# Patient Record
Sex: FEMALE | Race: WHITE | NOT HISPANIC OR LATINO | RURAL
[De-identification: names, ages, dates, MRNs, and addresses within clinical notes are randomized per-mention and may not be internally consistent; named-entity substitution may affect disease eponyms.]

---

## 2020-09-17 ENCOUNTER — HISTORICAL (OUTPATIENT)
Dept: ADMINISTRATIVE | Facility: HOSPITAL | Age: 40
End: 2020-09-17

## 2020-09-17 LAB — SARS-COV+SARS-COV-2 AG RESP QL IA.RAPID: NEGATIVE

## 2021-08-10 ENCOUNTER — OFFICE VISIT (OUTPATIENT)
Dept: FAMILY MEDICINE | Facility: CLINIC | Age: 41
End: 2021-08-10
Payer: MEDICAID

## 2021-08-10 DIAGNOSIS — Z11.52 ENCOUNTER FOR SCREENING FOR COVID-19: Primary | ICD-10-CM

## 2021-08-10 PROCEDURE — 87635 SARS-COV-2 COVID-19 AMP PRB: CPT | Mod: ,,, | Performed by: CLINICAL MEDICAL LABORATORY

## 2021-08-10 PROCEDURE — 87635 SARS-COV-2 (COVID-19) QUALITATIVE PCR: ICD-10-PCS | Mod: ,,, | Performed by: CLINICAL MEDICAL LABORATORY

## 2021-08-10 PROCEDURE — 99202 PR OFFICE/OUTPT VISIT, NEW, LEVL II, 15-29 MIN: ICD-10-PCS | Mod: GC,,, | Performed by: FAMILY MEDICINE

## 2021-08-10 PROCEDURE — 99202 OFFICE O/P NEW SF 15 MIN: CPT | Mod: GC,,, | Performed by: FAMILY MEDICINE

## 2021-08-11 LAB — SARS-COV-2 RNA RESP QL NAA+PROBE: NEGATIVE

## 2022-09-06 ENCOUNTER — OFFICE VISIT (OUTPATIENT)
Dept: FAMILY MEDICINE | Facility: CLINIC | Age: 42
End: 2022-09-06
Payer: MEDICAID

## 2022-09-06 VITALS
DIASTOLIC BLOOD PRESSURE: 93 MMHG | TEMPERATURE: 98 F | BODY MASS INDEX: 20.73 KG/M2 | WEIGHT: 129 LBS | RESPIRATION RATE: 18 BRPM | OXYGEN SATURATION: 99 % | HEART RATE: 96 BPM | SYSTOLIC BLOOD PRESSURE: 141 MMHG | HEIGHT: 66 IN

## 2022-09-06 DIAGNOSIS — Z11.52 ENCOUNTER FOR SCREENING LABORATORY TESTING FOR COVID-19 VIRUS: ICD-10-CM

## 2022-09-06 DIAGNOSIS — J32.9 SINUSITIS, UNSPECIFIED CHRONICITY, UNSPECIFIED LOCATION: Primary | ICD-10-CM

## 2022-09-06 PROBLEM — F19.10 POLYSUBSTANCE ABUSE: Status: ACTIVE | Noted: 2022-09-06

## 2022-09-06 PROBLEM — F41.9 ANXIETY: Status: ACTIVE | Noted: 2022-09-06

## 2022-09-06 PROBLEM — Z78.9 CURRENT DRINKER OF ALCOHOL: Status: ACTIVE | Noted: 2022-09-06

## 2022-09-06 PROBLEM — G40.909 SEIZURE DISORDER: Status: ACTIVE | Noted: 2022-09-06

## 2022-09-06 PROBLEM — F32.9 MAJOR DEPRESSION, SINGLE EPISODE: Status: ACTIVE | Noted: 2022-09-06

## 2022-09-06 PROBLEM — F41.0 PANIC ATTACK: Status: ACTIVE | Noted: 2022-09-06

## 2022-09-06 PROBLEM — I10 HYPERTENSION: Status: ACTIVE | Noted: 2022-09-06

## 2022-09-06 PROBLEM — Z87.898 H/O IDIOPATHIC SEIZURE: Status: ACTIVE | Noted: 2022-09-06

## 2022-09-06 LAB
CTP QC/QA: YES
SARS-COV-2 AG RESP QL IA.RAPID: NEGATIVE

## 2022-09-06 PROCEDURE — 99213 OFFICE O/P EST LOW 20 MIN: CPT | Mod: 25,,, | Performed by: NURSE PRACTITIONER

## 2022-09-06 PROCEDURE — 99213 PR OFFICE/OUTPT VISIT, EST, LEVL III, 20-29 MIN: ICD-10-PCS | Mod: 25,,, | Performed by: NURSE PRACTITIONER

## 2022-09-06 PROCEDURE — 1159F MED LIST DOCD IN RCRD: CPT | Mod: CPTII,,, | Performed by: NURSE PRACTITIONER

## 2022-09-06 PROCEDURE — 3008F BODY MASS INDEX DOCD: CPT | Mod: CPTII,,, | Performed by: NURSE PRACTITIONER

## 2022-09-06 PROCEDURE — 1160F RVW MEDS BY RX/DR IN RCRD: CPT | Mod: CPTII,,, | Performed by: NURSE PRACTITIONER

## 2022-09-06 PROCEDURE — 3077F SYST BP >= 140 MM HG: CPT | Mod: CPTII,,, | Performed by: NURSE PRACTITIONER

## 2022-09-06 PROCEDURE — 1159F PR MEDICATION LIST DOCUMENTED IN MEDICAL RECORD: ICD-10-PCS | Mod: CPTII,,, | Performed by: NURSE PRACTITIONER

## 2022-09-06 PROCEDURE — 3080F DIAST BP >= 90 MM HG: CPT | Mod: CPTII,,, | Performed by: NURSE PRACTITIONER

## 2022-09-06 PROCEDURE — 1160F PR REVIEW ALL MEDS BY PRESCRIBER/CLIN PHARMACIST DOCUMENTED: ICD-10-PCS | Mod: CPTII,,, | Performed by: NURSE PRACTITIONER

## 2022-09-06 PROCEDURE — 96372 PR INJECTION,THERAP/PROPH/DIAG2ST, IM OR SUBCUT: ICD-10-PCS | Mod: ,,, | Performed by: NURSE PRACTITIONER

## 2022-09-06 PROCEDURE — 3080F PR MOST RECENT DIASTOLIC BLOOD PRESSURE >= 90 MM HG: ICD-10-PCS | Mod: CPTII,,, | Performed by: NURSE PRACTITIONER

## 2022-09-06 PROCEDURE — 3008F PR BODY MASS INDEX (BMI) DOCUMENTED: ICD-10-PCS | Mod: CPTII,,, | Performed by: NURSE PRACTITIONER

## 2022-09-06 PROCEDURE — 3077F PR MOST RECENT SYSTOLIC BLOOD PRESSURE >= 140 MM HG: ICD-10-PCS | Mod: CPTII,,, | Performed by: NURSE PRACTITIONER

## 2022-09-06 PROCEDURE — 96372 THER/PROPH/DIAG INJ SC/IM: CPT | Mod: ,,, | Performed by: NURSE PRACTITIONER

## 2022-09-06 PROCEDURE — 87426 SARSCOV CORONAVIRUS AG IA: CPT | Mod: RHCUB | Performed by: NURSE PRACTITIONER

## 2022-09-06 RX ORDER — DEXAMETHASONE SODIUM PHOSPHATE 4 MG/ML
6 INJECTION, SOLUTION INTRA-ARTICULAR; INTRALESIONAL; INTRAMUSCULAR; INTRAVENOUS; SOFT TISSUE
Status: COMPLETED | OUTPATIENT
Start: 2022-09-06 | End: 2022-09-06

## 2022-09-06 RX ORDER — CEFDINIR 300 MG/1
300 CAPSULE ORAL 2 TIMES DAILY
Qty: 20 CAPSULE | Refills: 0 | Status: SHIPPED | OUTPATIENT
Start: 2022-09-06 | End: 2022-09-16

## 2022-09-06 RX ORDER — LURASIDONE HYDROCHLORIDE 80 MG/1
TABLET, FILM COATED ORAL
COMMUNITY
End: 2023-08-30

## 2022-09-06 RX ORDER — PREDNISONE 20 MG/1
40 TABLET ORAL DAILY
Qty: 10 TABLET | Refills: 0 | Status: SHIPPED | OUTPATIENT
Start: 2022-09-06 | End: 2022-09-11

## 2022-09-06 RX ORDER — TRAZODONE HYDROCHLORIDE 50 MG/1
TABLET ORAL
COMMUNITY
End: 2023-08-30

## 2022-09-06 RX ORDER — SUMATRIPTAN 50 MG/1
TABLET, FILM COATED ORAL
COMMUNITY
Start: 2022-04-27 | End: 2023-08-30

## 2022-09-06 RX ORDER — CEFTRIAXONE 1 G/1
1 INJECTION, POWDER, FOR SOLUTION INTRAMUSCULAR; INTRAVENOUS
Status: COMPLETED | OUTPATIENT
Start: 2022-09-06 | End: 2022-09-06

## 2022-09-06 RX ORDER — ZOLPIDEM TARTRATE 5 MG/1
TABLET ORAL
COMMUNITY
End: 2023-08-30

## 2022-09-06 RX ORDER — CLONAZEPAM 2 MG/1
1 TABLET ORAL
COMMUNITY
End: 2023-08-30

## 2022-09-06 RX ORDER — CITALOPRAM 20 MG/1
1 TABLET, FILM COATED ORAL
COMMUNITY
End: 2023-08-30

## 2022-09-06 RX ORDER — CHLORDIAZEPOXIDE HYDROCHLORIDE 25 MG/1
CAPSULE, GELATIN COATED ORAL
COMMUNITY
End: 2023-08-30

## 2022-09-06 RX ORDER — BUSPIRONE HYDROCHLORIDE 10 MG/1
TABLET ORAL
COMMUNITY
End: 2023-08-30

## 2022-09-06 RX ORDER — OXCARBAZEPINE 300 MG/1
1 TABLET, FILM COATED ORAL
COMMUNITY
End: 2023-08-30

## 2022-09-06 RX ADMIN — CEFTRIAXONE 1 G: 1 INJECTION, POWDER, FOR SOLUTION INTRAMUSCULAR; INTRAVENOUS at 10:09

## 2022-09-06 RX ADMIN — DEXAMETHASONE SODIUM PHOSPHATE 6 MG: 4 INJECTION, SOLUTION INTRA-ARTICULAR; INTRALESIONAL; INTRAMUSCULAR; INTRAVENOUS; SOFT TISSUE at 10:09

## 2022-09-06 NOTE — LETTER
September 6, 2022      [facility]  [address] [facility] [address]  Phone: [phone]  Fax: [phone]       Patient: Ericka Vo   YOB: 1980  Date of Visit: 09/06/2022    To Whom It May Concern:    Inocencia Vo  was at [facility] on 09/06/2022. The patient may return to work/school on 09/07/2022 with no restrictions. If you have any questions or concerns, or if I can be of further assistance, please do not hesitate to contact me.    Sincerely,    Delilah Mackenzie RN

## 2022-09-06 NOTE — PROGRESS NOTES
"Subjective:       Patient ID: Ericka Vo is a 42 y.o. female.    Chief Complaint: COVID-19 Concerns (Sinus pressure with congestion and drainage, sore throat)    Presents to clinic as above. No fever.S/s for 1 week    Review of Systems   Constitutional: Negative.    HENT:  Positive for congestion, sinus pain and sore throat. Negative for ear pain.    Respiratory:  Positive for cough. Negative for shortness of breath and wheezing.    Cardiovascular: Negative.    Musculoskeletal: Negative.    Neurological:  Positive for headaches.        Reviewed family, medical, surgical, and social history.    Objective:      BP (!) 141/93   Pulse 96   Temp 97.6 °F (36.4 °C)   Resp 18   Ht 5' 6" (1.676 m)   Wt 58.5 kg (129 lb)   LMP 08/25/2022 (Exact Date)   SpO2 99%   BMI 20.82 kg/m²   Physical Exam  Vitals and nursing note reviewed.   Constitutional:       General: She is not in acute distress.     Appearance: Normal appearance. She is normal weight. She is not ill-appearing, toxic-appearing or diaphoretic.   HENT:      Head: Normocephalic and atraumatic.      Right Ear: Hearing, tympanic membrane, ear canal and external ear normal.      Left Ear: Hearing, tympanic membrane, ear canal and external ear normal.      Nose: Nasal tenderness, mucosal edema, congestion and rhinorrhea present. Rhinorrhea is purulent.      Right Turbinates: Enlarged and swollen.      Left Turbinates: Enlarged and swollen.      Right Sinus: Maxillary sinus tenderness and frontal sinus tenderness present.      Left Sinus: Maxillary sinus tenderness and frontal sinus tenderness present.      Mouth/Throat:      Mouth: Mucous membranes are moist.      Pharynx: No oropharyngeal exudate or posterior oropharyngeal erythema.   Cardiovascular:      Rate and Rhythm: Normal rate and regular rhythm.      Heart sounds: Normal heart sounds.   Pulmonary:      Effort: Pulmonary effort is normal.      Breath sounds: Normal breath sounds.   Skin:     General: " Skin is warm and dry.   Neurological:      Mental Status: She is alert.   Psychiatric:         Mood and Affect: Mood normal.         Behavior: Behavior normal.         Thought Content: Thought content normal.         Judgment: Judgment normal.          Office Visit on 09/06/2022   Component Date Value Ref Range Status    SARS Coronavirus 2 Antigen 09/06/2022 Negative  Negative Final     Acceptable 09/06/2022 Yes   Final      Assessment:       1. Sinusitis, unspecified chronicity, unspecified location    2. Encounter for screening laboratory testing for COVID-19 virus          Plan:       Sinusitis, unspecified chronicity, unspecified location  -     cefdinir (OMNICEF) 300 MG capsule; Take 1 capsule (300 mg total) by mouth 2 (two) times daily. for 10 days  Dispense: 20 capsule; Refill: 0  -     dexamethasone injection 6 mg  -     cefTRIAXone injection 1 g  -     predniSONE (DELTASONE) 20 MG tablet; Take 2 tablets (40 mg total) by mouth once daily. for 5 days  Dispense: 10 tablet; Refill: 0    Encounter for screening laboratory testing for COVID-19 virus  -     SARS Coronavirus 2 Antigen, POCT  Copy of result given (neg rapid covid)  Retest with any new or persistent s/s  RTC PRN           Risks, benefits, and side effects were discussed with the patient. All questions were answered to the fullest satisfaction of the patient, and pt verbalized understanding and agreement to treatment plan. Pt was to call with any new or worsening symptoms, or present to the ER.

## 2022-11-02 ENCOUNTER — OFFICE VISIT (OUTPATIENT)
Dept: FAMILY MEDICINE | Facility: CLINIC | Age: 42
End: 2022-11-02
Payer: MEDICAID

## 2022-11-02 VITALS
OXYGEN SATURATION: 99 % | TEMPERATURE: 98 F | SYSTOLIC BLOOD PRESSURE: 144 MMHG | BODY MASS INDEX: 20.16 KG/M2 | WEIGHT: 121 LBS | HEART RATE: 87 BPM | DIASTOLIC BLOOD PRESSURE: 88 MMHG | HEIGHT: 65 IN

## 2022-11-02 DIAGNOSIS — J01.90 ACUTE NON-RECURRENT SINUSITIS, UNSPECIFIED LOCATION: Primary | ICD-10-CM

## 2022-11-02 DIAGNOSIS — S09.90XA TRAUMATIC INJURY OF HEAD, INITIAL ENCOUNTER: ICD-10-CM

## 2022-11-02 PROCEDURE — 99213 OFFICE O/P EST LOW 20 MIN: CPT | Mod: ,,, | Performed by: NURSE PRACTITIONER

## 2022-11-02 PROCEDURE — 3079F PR MOST RECENT DIASTOLIC BLOOD PRESSURE 80-89 MM HG: ICD-10-PCS | Mod: CPTII,,, | Performed by: NURSE PRACTITIONER

## 2022-11-02 PROCEDURE — 3008F BODY MASS INDEX DOCD: CPT | Mod: CPTII,,, | Performed by: NURSE PRACTITIONER

## 2022-11-02 PROCEDURE — 3008F PR BODY MASS INDEX (BMI) DOCUMENTED: ICD-10-PCS | Mod: CPTII,,, | Performed by: NURSE PRACTITIONER

## 2022-11-02 PROCEDURE — 1159F MED LIST DOCD IN RCRD: CPT | Mod: CPTII,,, | Performed by: NURSE PRACTITIONER

## 2022-11-02 PROCEDURE — 99213 PR OFFICE/OUTPT VISIT, EST, LEVL III, 20-29 MIN: ICD-10-PCS | Mod: ,,, | Performed by: NURSE PRACTITIONER

## 2022-11-02 PROCEDURE — 1159F PR MEDICATION LIST DOCUMENTED IN MEDICAL RECORD: ICD-10-PCS | Mod: CPTII,,, | Performed by: NURSE PRACTITIONER

## 2022-11-02 PROCEDURE — 3077F PR MOST RECENT SYSTOLIC BLOOD PRESSURE >= 140 MM HG: ICD-10-PCS | Mod: CPTII,,, | Performed by: NURSE PRACTITIONER

## 2022-11-02 PROCEDURE — 3077F SYST BP >= 140 MM HG: CPT | Mod: CPTII,,, | Performed by: NURSE PRACTITIONER

## 2022-11-02 PROCEDURE — 3079F DIAST BP 80-89 MM HG: CPT | Mod: CPTII,,, | Performed by: NURSE PRACTITIONER

## 2022-11-02 RX ORDER — GUAIFENESIN AND PHENYLEPHRINE HCL 385; 10 MG/1; MG/1
1 TABLET ORAL 4 TIMES DAILY PRN
Qty: 20 TABLET | Refills: 0 | Status: SHIPPED | OUTPATIENT
Start: 2022-11-02 | End: 2023-08-30

## 2022-11-02 RX ORDER — AMOXICILLIN AND CLAVULANATE POTASSIUM 875; 125 MG/1; MG/1
1 TABLET, FILM COATED ORAL 2 TIMES DAILY
Qty: 20 TABLET | Refills: 0 | Status: SHIPPED | OUTPATIENT
Start: 2022-11-02 | End: 2022-11-12

## 2022-11-02 NOTE — PROGRESS NOTES
Subjective:       Patient ID: Ericka Vo is a 42 y.o. female.    Chief Complaint: Cough (And drainage )    Cough, sinus  pressure and drainage with headache- pt states she hit her head a week ago and has been having headaches, nausea and some tingling in her hands (she refused to go to the Er despite alarming symptoms)  Review of Systems   Constitutional:  Negative for appetite change, fatigue and fever.   HENT:  Positive for nasal congestion, sinus pressure/congestion and sneezing. Negative for ear pain and sore throat.    Eyes:  Negative for pain, discharge and itching.   Respiratory:  Positive for cough. Negative for shortness of breath.    Cardiovascular:  Negative for chest pain and leg swelling.   Gastrointestinal:  Negative for abdominal pain, change in bowel habit, nausea, vomiting and change in bowel habit.   Musculoskeletal:  Negative for back pain, gait problem and neck pain.   Integumentary:  Negative for rash and wound.   Allergic/Immunologic: Negative for immunocompromised state.   Neurological:  Positive for headaches. Negative for dizziness, weakness and numbness.   All other systems reviewed and are negative.      Objective:      Physical Exam  Vitals and nursing note reviewed.   Constitutional:       General: She is not in acute distress.     Appearance: Normal appearance. She is not ill-appearing, toxic-appearing or diaphoretic.   HENT:      Head: Normocephalic.      Right Ear: Tympanic membrane, ear canal and external ear normal.      Left Ear: Tympanic membrane, ear canal and external ear normal.      Nose: Mucosal edema and congestion present. No rhinorrhea.      Right Turbinates: Swollen.      Left Turbinates: Swollen.      Right Sinus: Maxillary sinus tenderness present.      Left Sinus: Maxillary sinus tenderness present.      Mouth/Throat:      Mouth: Mucous membranes are moist.      Pharynx: Posterior oropharyngeal erythema present. No oropharyngeal exudate.   Eyes:      General: No  scleral icterus.        Right eye: No discharge.         Left eye: No discharge.      Extraocular Movements: Extraocular movements intact.      Conjunctiva/sclera: Conjunctivae normal.      Pupils: Pupils are equal, round, and reactive to light.   Cardiovascular:      Rate and Rhythm: Normal rate and regular rhythm.      Pulses: Normal pulses.      Heart sounds: Normal heart sounds. No murmur heard.  Pulmonary:      Effort: Pulmonary effort is normal. No respiratory distress.      Breath sounds: Normal breath sounds. No wheezing, rhonchi or rales.   Musculoskeletal:         General: Normal range of motion.      Cervical back: Neck supple. No tenderness.   Lymphadenopathy:      Cervical: No cervical adenopathy.   Skin:     General: Skin is warm and dry.      Capillary Refill: Capillary refill takes less than 2 seconds.      Findings: No rash.   Neurological:      Mental Status: She is alert and oriented to person, place, and time.      GCS: GCS eye subscore is 4. GCS verbal subscore is 5. GCS motor subscore is 6.      Cranial Nerves: Cranial nerves 2-12 are intact.      Sensory: Sensation is intact.      Motor: Motor function is intact.      Coordination: Coordination is intact.      Comments: A 1 cm raised contusion noted to the right lateral/posterior skull with mild ecchymosis noted   Psychiatric:         Mood and Affect: Mood normal.         Behavior: Behavior normal.         Thought Content: Thought content normal.         Judgment: Judgment normal.       Office Visit on 09/06/2022   Component Date Value Ref Range Status    SARS Coronavirus 2 Antigen 09/06/2022 Negative  Negative Final     Acceptable 09/06/2022 Yes   Final      Assessment:       1. Acute non-recurrent sinusitis, unspecified location        Plan:   Acute non-recurrent sinusitis, unspecified location  -     amoxicillin-clavulanate 875-125mg (AUGMENTIN) 875-125 mg per tablet; Take 1 tablet by mouth 2 (two) times daily. for 20 doses   Dispense: 20 tablet; Refill: 0  -     phenylephrine-guaiFENesin (DECONEX IR)  mg Tab; Take 1 tablet by mouth 4 (four) times daily as needed (congestion).  Dispense: 20 tablet; Refill: 0     Pt advised to go to the ER and discussed concerning symptoms to warrant a CT scan

## 2022-11-02 NOTE — LETTER
November 2, 2022      [facility]  [address] [facility] [address]  Phone: [phone]  Fax: [phone]       Patient: Ericka Vo   YOB: 1980  Date of Visit: 11/02/2022    To Whom It May Concern:    Inocencia Vo  was at [facility] on 11/02/2022. The patient may return to work/school on 11/03/2022 without restrictions. If you have any questions or concerns, or if I can be of further assistance, please do not hesitate to contact me.    Sincerely,    BENOIT Joseph

## 2022-11-23 ENCOUNTER — HOSPITAL ENCOUNTER (EMERGENCY)
Facility: HOSPITAL | Age: 42
Discharge: HOME | End: 2022-11-23
Attending: FAMILY MEDICINE
Payer: MEDICAID

## 2022-11-23 VITALS
WEIGHT: 116 LBS | TEMPERATURE: 98 F | RESPIRATION RATE: 19 BRPM | DIASTOLIC BLOOD PRESSURE: 81 MMHG | BODY MASS INDEX: 19.33 KG/M2 | HEART RATE: 110 BPM | SYSTOLIC BLOOD PRESSURE: 130 MMHG | OXYGEN SATURATION: 93 % | HEIGHT: 65 IN

## 2022-11-23 DIAGNOSIS — S20.211A RIB CONTUSION, RIGHT, INITIAL ENCOUNTER: ICD-10-CM

## 2022-11-23 DIAGNOSIS — B34.9 VIRAL SYNDROME: ICD-10-CM

## 2022-11-23 DIAGNOSIS — R04.2 HEMOPTYSIS: ICD-10-CM
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
## 2022-11-23 DIAGNOSIS — R74.01 ELEVATED AST (SGOT): ICD-10-CM

## 2022-11-23 DIAGNOSIS — D53.9 MACROCYTIC ANEMIA: Primary | ICD-10-CM

## 2022-11-23 DIAGNOSIS — Z78.9 CURRENT DRINKER OF ALCOHOL: ICD-10-CM

## 2022-11-23 LAB
ALBUMIN SERPL BCP-MCNC: 3.8 G/DL (ref 3.5–5)
ALBUMIN/GLOB SERPL: 0.9 {RATIO}
ALP SERPL-CCNC: 103 U/L (ref 37–98)
ALT SERPL W P-5'-P-CCNC: 41 U/L (ref 13–56)
ANION GAP SERPL CALCULATED.3IONS-SCNC: 13 MMOL/L (ref 7–16)
AST SERPL W P-5'-P-CCNC: 149 U/L (ref 15–37)
BASOPHILS # BLD AUTO: 0.05 K/UL (ref 0–0.2)
BASOPHILS NFR BLD AUTO: 1 % (ref 0–1)
BILIRUB SERPL-MCNC: 0.9 MG/DL (ref ?–1.2)
BUN SERPL-MCNC: 5 MG/DL (ref 7–18)
BUN/CREAT SERPL: 9 (ref 6–20)
CALCIUM SERPL-MCNC: 8.2 MG/DL (ref 8.5–10.1)
CHLORIDE SERPL-SCNC: 98 MMOL/L (ref 98–107)
CO2 SERPL-SCNC: 30 MMOL/L (ref 21–32)
CREAT SERPL-MCNC: 0.56 MG/DL (ref 0.55–1.02)
DIFFERENTIAL METHOD BLD: ABNORMAL
EGFR (NO RACE VARIABLE) (RUSH/TITUS): 116 ML/MIN/1.73M²
EOSINOPHIL # BLD AUTO: 0.02 K/UL (ref 0–0.5)
EOSINOPHIL NFR BLD AUTO: 0.4 % (ref 1–4)
ERYTHROCYTE [DISTWIDTH] IN BLOOD BY AUTOMATED COUNT: 13.4 % (ref 11.5–14.5)
FLUAV AG UPPER RESP QL IA.RAPID: NEGATIVE
FLUBV AG UPPER RESP QL IA.RAPID: NEGATIVE
GLOBULIN SER-MCNC: 4.1 G/DL (ref 2–4)
GLUCOSE SERPL-MCNC: 117 MG/DL (ref 74–106)
HCT VFR BLD AUTO: 38.5 % (ref 38–47)
HGB BLD-MCNC: 13.5 G/DL (ref 12–16)
IMM GRANULOCYTES # BLD AUTO: 0.01 K/UL (ref 0–0.04)
IMM GRANULOCYTES NFR BLD: 0.2 % (ref 0–0.4)
LYMPHOCYTES # BLD AUTO: 0.67 K/UL (ref 1–4.8)
LYMPHOCYTES NFR BLD AUTO: 13.2 % (ref 27–41)
LYMPHOCYTES NFR BLD MANUAL: 13 % (ref 27–41)
MACROCYTES BLD QL SMEAR: ABNORMAL
MCH RBC QN AUTO: 37.4 PG (ref 27–31)
MCHC RBC AUTO-ENTMCNC: 35.1 G/DL (ref 32–36)
MCV RBC AUTO: 106.6 FL (ref 80–96)
MONOCYTES # BLD AUTO: 0.31 K/UL (ref 0–0.8)
MONOCYTES NFR BLD AUTO: 6.1 % (ref 2–6)
MONOCYTES NFR BLD MANUAL: 6 % (ref 2–6)
MPC BLD CALC-MCNC: 9.1 FL (ref 9.4–12.4)
NEUTROPHILS # BLD AUTO: 4.01 K/UL (ref 1.8–7.7)
NEUTROPHILS NFR BLD AUTO: 79.1 % (ref 53–65)
NEUTS BAND NFR BLD MANUAL: 2 % (ref 1–5)
NEUTS SEG NFR BLD MANUAL: 79 % (ref 50–62)
NRBC # BLD AUTO: 0 X10E3/UL
NRBC, AUTO (.00): 0 %
PLATELET # BLD AUTO: 129 K/UL (ref 150–400)
PLATELET MORPHOLOGY: ABNORMAL
POLYCHROMASIA BLD QL SMEAR: ABNORMAL
POTASSIUM SERPL-SCNC: 3 MMOL/L (ref 3.5–5.1)
PROT SERPL-MCNC: 7.9 G/DL (ref 6.4–8.2)
RBC # BLD AUTO: 3.61 M/UL (ref 4.2–5.4)
SARS-COV+SARS-COV-2 AG RESP QL IA.RAPID: NEGATIVE
SODIUM SERPL-SCNC: 138 MMOL/L (ref 136–145)
STOMATOCYTES BLD QL SMEAR: ABNORMAL
WBC # BLD AUTO: 5.07 K/UL (ref 4.5–11)

## 2022-11-23 PROCEDURE — 99284 EMERGENCY DEPT VISIT MOD MDM: CPT | Mod: CS,,, | Performed by: NURSE PRACTITIONER

## 2022-11-23 PROCEDURE — 99284 PR EMERGENCY DEPT VISIT,LEVEL IV: ICD-10-PCS | Mod: CS,,, | Performed by: NURSE PRACTITIONER

## 2022-11-23 PROCEDURE — 25000003 PHARM REV CODE 250: Performed by: NURSE PRACTITIONER

## 2022-11-23 PROCEDURE — 99285 EMERGENCY DEPT VISIT HI MDM: CPT | Mod: 25

## 2022-11-23 PROCEDURE — 85025 COMPLETE CBC W/AUTO DIFF WBC: CPT | Performed by: NURSE PRACTITIONER

## 2022-11-23 PROCEDURE — 87428 SARSCOV & INF VIR A&B AG IA: CPT | Performed by: NURSE PRACTITIONER

## 2022-11-23 PROCEDURE — 80053 COMPREHEN METABOLIC PANEL: CPT | Performed by: NURSE PRACTITIONER

## 2022-11-23 RX ORDER — ONDANSETRON 4 MG/1
4 TABLET, FILM COATED ORAL EVERY 6 HOURS
Qty: 12 TABLET | Refills: 0 | Status: SHIPPED | OUTPATIENT
Start: 2022-11-23 | End: 2023-08-30

## 2022-11-23 RX ORDER — PANTOPRAZOLE SODIUM 40 MG/1
40 TABLET, DELAYED RELEASE ORAL 2 TIMES DAILY
Qty: 60 TABLET | Refills: 11 | Status: SHIPPED | OUTPATIENT
Start: 2022-11-23 | End: 2023-08-30

## 2022-11-23 RX ORDER — POTASSIUM CHLORIDE 750 MG/1
10 TABLET, EXTENDED RELEASE ORAL DAILY
Qty: 5 TABLET | Refills: 0 | Status: SHIPPED | OUTPATIENT
Start: 2022-11-23 | End: 2022-11-28

## 2022-11-23 RX ORDER — POTASSIUM CHLORIDE 20 MEQ/1
40 TABLET, EXTENDED RELEASE ORAL ONCE
Status: COMPLETED | OUTPATIENT
Start: 2022-11-23 | End: 2022-11-23

## 2022-11-23 RX ADMIN — POTASSIUM CHLORIDE 40 MEQ: 20 TABLET, EXTENDED RELEASE ORAL at 05:11

## 2022-11-23 NOTE — ED PROVIDER NOTES
Encounter Date: 11/23/2022       History     Chief Complaint   Patient presents with    Hemoptysis     42 year old female presents to ED with complaint of right side pain and coughing up blood. She states for the last 2 days she has had a cough with blood noted in cough on today. She states she was also having vomiting and diarrhea for last 2 days as well with chills. Denies fever, shortness of breath. States she went to urgent care with recent flu/covid testing. Unable to locate testing at this time.       Review of patient's allergies indicates:  No Known Allergies  No past medical history on file.  No past surgical history on file.  No family history on file.     Review of Systems   Constitutional:  Positive for chills. Negative for fever.   HENT:  Negative for sinus pressure and sinus pain.    Eyes:  Negative for photophobia and visual disturbance.   Respiratory:  Negative for cough and shortness of breath.    Cardiovascular:  Negative for chest pain and palpitations.   Gastrointestinal:  Positive for diarrhea and vomiting. Negative for abdominal distention and constipation.   Endocrine: Negative for cold intolerance and heat intolerance.   Genitourinary:  Negative for difficulty urinating and dysuria.   Musculoskeletal:  Positive for myalgias. Negative for arthralgias.   Skin:  Negative for color change and wound.   Allergic/Immunologic: Negative for environmental allergies and food allergies.   Neurological:  Negative for dizziness and weakness.   Hematological:  Negative for adenopathy. Does not bruise/bleed easily.   Psychiatric/Behavioral:  Negative for agitation and confusion.    All other systems reviewed and are negative.    Physical Exam     Initial Vitals [11/23/22 1606]   BP Pulse Resp Temp SpO2   130/81 110 19 98.1 °F (36.7 °C) (!) 93 %      MAP       --         Physical Exam    Nursing note and vitals reviewed.  Constitutional: She appears well-developed and well-nourished.   HENT:   Head:  Normocephalic and atraumatic.   Eyes: Pupils are equal, round, and reactive to light.   Neck: Neck supple.   Normal range of motion.  Cardiovascular:  Normal rate.           No murmur heard.  Pulmonary/Chest: She has no wheezes. She has no rhonchi. She exhibits tenderness.     Abdominal: Abdomen is soft. She exhibits no distension. There is no abdominal tenderness.   Musculoskeletal:         General: No tenderness or edema.      Cervical back: Normal range of motion and neck supple.     Neurological: She is alert and oriented to person, place, and time.   Skin: Skin is warm and dry. Capillary refill takes less than 2 seconds.   Psychiatric: Thought content normal.       Medical Screening Exam   See Full Note    ED Course   Procedures  Labs Reviewed   COMPREHENSIVE METABOLIC PANEL - Abnormal; Notable for the following components:       Result Value    Potassium 3.0 (*)     Glucose 117 (*)     BUN 5 (*)     Calcium 8.2 (*)     Globulin 4.1 (*)     Alk Phos 103 (*)      (*)     All other components within normal limits   CBC WITH DIFFERENTIAL - Abnormal; Notable for the following components:    RBC 3.61 (*)     .6 (*)     MCH 37.4 (*)     Platelet Count 129 (*)     MPV 9.1 (*)     Neutrophils % 79.1 (*)     Lymphocytes % 13.2 (*)     Monocytes % 6.1 (*)     Eosinophils % 0.4 (*)     Lymphocytes, Absolute 0.67 (*)     All other components within normal limits   MANUAL DIFFERENTIAL - Abnormal; Notable for the following components:    Segmented Neutrophils, Man % 79 (*)     Lymphocytes, Man % 13 (*)     Platelet Morphology Decreased (*)     All other components within normal limits   SARS-COV2 (COVID) W/ FLU ANTIGEN - Normal    Narrative:     Negative SARS-CoV results should not be used as the sole basis for treatment or patient management decisions; negative results should be considered in the context of a patient's recent exposures, history and the presene of clinical signs and symptoms consistent with  COVID-19.  Negative results should be treated as presumptive and confirmed by molecular assay, if necessary for patient management.   CBC W/ AUTO DIFFERENTIAL    Narrative:     The following orders were created for panel order CBC auto differential.  Procedure                               Abnormality         Status                     ---------                               -----------         ------                     CBC with Differential[[id number]]        Abnormal            Final result               Manual Differential[[id number]]          Abnormal            Final result                 Please view results for these tests on the individual orders.          Imaging Results              X-Ray Ribs 2 View Right (Final result)  Result time 11/23/22 16:54:07      Final result by Fortino Juarez DO (11/23/22 16:54:07)                   Impression:      No acutely displaced right-sided rib fractures.      Electronically signed by: Fortino Juarez  Date:    11/23/2022  Time:    16:54               Narrative:    EXAMINATION:  XR RIBS 2 VIEW RIGHT    CLINICAL HISTORY:  Unspecified fall, initial encounter    TECHNIQUE:  XR RIBS 2 VIEW RIGHT    COMPARISON:  1/17/2020    FINDINGS:  Lungs are clear.  No pleural abnormality.  No acutely displaced right-sided rib fractures.                                       X-Ray Chest AP Portable (Final result)  Result time 11/23/22 16:52:49      Final result by Fortino Juarez DO (11/23/22 16:52:49)                   Impression:      No acute pulmonary disease      Electronically signed by: Fortino Juarez  Date:    11/23/2022  Time:    16:52               Narrative:    EXAMINATION:  XR CHEST AP PORTABLE    CLINICAL HISTORY:  Hemoptysis    TECHNIQUE:  XR CHEST AP PORTABLE    COMPARISON:  2020    FINDINGS:  No lines or tubes.    Lungs are clear.    Normal pleura.    Cardiac silhouette is similar to comparison exam.    No obvious acute bone findings.                                        Medications   potassium chloride SA CR tablet 40 mEq (40 mEq Oral Given 11/23/22 1743)           APC / Resident Notes:   Review of labs notable for elevated liver enzymes and MCV. Endorses intake of 6 pack of beer daily. Instructed on alcohol avoidance.            Clinical Impression:   Final diagnoses:  [W19.XXXA] Fall  [R04.2] Hemoptysis  [D53.9] Macrocytic anemia (Primary)  [B34.9] Viral syndrome  [R74.01] Elevated AST (SGOT)  [Z78.9] Current drinker of alcohol  [S20.211A] Rib contusion, right, initial encounter        ED Disposition Condition    Discharge Stable          ED Prescriptions       Medication Sig Dispense Start Date End Date Auth. Provider    ondansetron (ZOFRAN) 4 MG tablet Take 1 tablet (4 mg total) by mouth every 6 (six) hours. 12 tablet 11/23/2022 -- BENOIT Gonzalez    pantoprazole (PROTONIX) 40 MG tablet Take 1 tablet (40 mg total) by mouth 2 (two) times daily. 60 tablet 11/23/2022 11/23/2023 BENOTI Gonzalez    potassium chloride (KLOR-CON) 10 MEQ TbSR Take 1 tablet (10 mEq total) by mouth once daily. for 5 days 5 tablet 11/23/2022 11/28/2022 BENOIT Gonzalez          Follow-up Information    None          BENOIT Gonzalez  11/23/22 7090       BENOIT Gonzalez  11/23/22 6100

## 2022-11-23 NOTE — ED TRIAGE NOTES
PATIENT PRESENTS TO ER WITH COMPLAINT FALL WHICH INJURIED HER RIGHT SIDE RIB 2 DAYS AGO. NOW HAS A COMPLAINT OF VOMITING BLOOD

## 2023-02-06 PROBLEM — J01.90 ACUTE NON-RECURRENT SINUSITIS: Status: RESOLVED | Noted: 2022-11-02 | Resolved: 2023-02-06

## 2023-07-07 ENCOUNTER — HOSPITAL ENCOUNTER (OUTPATIENT)
Dept: RADIOLOGY | Facility: HOSPITAL | Age: 43
Discharge: HOME | End: 2023-07-07
Attending: SPECIALIST
Payer: MEDICAID

## 2023-07-07 VITALS — HEIGHT: 64 IN | BODY MASS INDEX: 21.34 KG/M2 | WEIGHT: 125 LBS

## 2023-07-07 DIAGNOSIS — Z12.31 OTHER SCREENING MAMMOGRAM: ICD-10-CM

## 2023-07-07 PROCEDURE — 77067 SCR MAMMO BI INCL CAD: CPT | Mod: TC

## 2023-08-30 ENCOUNTER — OFFICE VISIT (OUTPATIENT)
Dept: FAMILY MEDICINE | Facility: CLINIC | Age: 43
End: 2023-08-30
Payer: MEDICAID

## 2023-08-30 VITALS
DIASTOLIC BLOOD PRESSURE: 84 MMHG | WEIGHT: 122 LBS | HEIGHT: 64 IN | RESPIRATION RATE: 17 BRPM | HEART RATE: 91 BPM | SYSTOLIC BLOOD PRESSURE: 116 MMHG | TEMPERATURE: 99 F | OXYGEN SATURATION: 96 % | BODY MASS INDEX: 20.83 KG/M2

## 2023-08-30 DIAGNOSIS — R11.2 NAUSEA AND VOMITING, UNSPECIFIED VOMITING TYPE: ICD-10-CM

## 2023-08-30 DIAGNOSIS — N91.2 AMENORRHEA: ICD-10-CM

## 2023-08-30 DIAGNOSIS — R30.0 DYSURIA: ICD-10-CM

## 2023-08-30 DIAGNOSIS — R10.9 ABDOMINAL PAIN, UNSPECIFIED ABDOMINAL LOCATION: Primary | ICD-10-CM

## 2023-08-30 DIAGNOSIS — Z20.828 CONTACT WITH AND (SUSPECTED) EXPOSURE TO OTHER VIRAL COMMUNICABLE DISEASES: ICD-10-CM

## 2023-08-30 PROBLEM — F32.A DEPRESSION: Status: ACTIVE | Noted: 2023-08-30

## 2023-08-30 LAB
B-HCG UR QL: NEGATIVE
BILIRUB SERPL-MCNC: NEGATIVE MG/DL
BLOOD URINE, POC: NEGATIVE
COLOR, POC UA: YELLOW
CTP QC/QA: YES
FLUAV AG NPH QL: NEGATIVE
FLUBV AG NPH QL: NEGATIVE
GLUCOSE UR QL STRIP: NEGATIVE
KETONES UR QL STRIP: NEGATIVE
LEUKOCYTE ESTERASE URINE, POC: ABNORMAL
NITRITE, POC UA: NEGATIVE
PH, POC UA: 7
PROTEIN, POC: NEGATIVE
SARS-COV-2 AG RESP QL IA.RAPID: NEGATIVE
SPECIFIC GRAVITY, POC UA: 1.01
UROBILINOGEN, POC UA: 1

## 2023-08-30 PROCEDURE — 87426 SARSCOV CORONAVIRUS AG IA: CPT | Mod: RHCUB | Performed by: NURSE PRACTITIONER

## 2023-08-30 PROCEDURE — 3008F PR BODY MASS INDEX (BMI) DOCUMENTED: ICD-10-PCS | Mod: CPTII,,, | Performed by: NURSE PRACTITIONER

## 2023-08-30 PROCEDURE — 1159F PR MEDICATION LIST DOCUMENTED IN MEDICAL RECORD: ICD-10-PCS | Mod: CPTII,,, | Performed by: NURSE PRACTITIONER

## 2023-08-30 PROCEDURE — 1160F PR REVIEW ALL MEDS BY PRESCRIBER/CLIN PHARMACIST DOCUMENTED: ICD-10-PCS | Mod: CPTII,,, | Performed by: NURSE PRACTITIONER

## 2023-08-30 PROCEDURE — 1160F RVW MEDS BY RX/DR IN RCRD: CPT | Mod: CPTII,,, | Performed by: NURSE PRACTITIONER

## 2023-08-30 PROCEDURE — 1159F MED LIST DOCD IN RCRD: CPT | Mod: CPTII,,, | Performed by: NURSE PRACTITIONER

## 2023-08-30 PROCEDURE — 3074F SYST BP LT 130 MM HG: CPT | Mod: CPTII,,, | Performed by: NURSE PRACTITIONER

## 2023-08-30 PROCEDURE — 81025 URINE PREGNANCY TEST: CPT | Mod: RHCUB | Performed by: NURSE PRACTITIONER

## 2023-08-30 PROCEDURE — 87804 INFLUENZA ASSAY W/OPTIC: CPT | Mod: RHCUB | Performed by: NURSE PRACTITIONER

## 2023-08-30 PROCEDURE — 3008F BODY MASS INDEX DOCD: CPT | Mod: CPTII,,, | Performed by: NURSE PRACTITIONER

## 2023-08-30 PROCEDURE — 3079F PR MOST RECENT DIASTOLIC BLOOD PRESSURE 80-89 MM HG: ICD-10-PCS | Mod: CPTII,,, | Performed by: NURSE PRACTITIONER

## 2023-08-30 PROCEDURE — 87186 CULTURE, URINE: ICD-10-PCS | Mod: ,,, | Performed by: CLINICAL MEDICAL LABORATORY

## 2023-08-30 PROCEDURE — 87086 URINE CULTURE/COLONY COUNT: CPT | Mod: ,,, | Performed by: CLINICAL MEDICAL LABORATORY

## 2023-08-30 PROCEDURE — 99214 OFFICE O/P EST MOD 30 MIN: CPT | Mod: ,,, | Performed by: NURSE PRACTITIONER

## 2023-08-30 PROCEDURE — 87186 SC STD MICRODIL/AGAR DIL: CPT | Mod: ,,, | Performed by: CLINICAL MEDICAL LABORATORY

## 2023-08-30 PROCEDURE — 87077 CULTURE, URINE: ICD-10-PCS | Mod: ,,, | Performed by: CLINICAL MEDICAL LABORATORY

## 2023-08-30 PROCEDURE — 81003 URINALYSIS AUTO W/O SCOPE: CPT | Mod: RHCUB | Performed by: NURSE PRACTITIONER

## 2023-08-30 PROCEDURE — 3079F DIAST BP 80-89 MM HG: CPT | Mod: CPTII,,, | Performed by: NURSE PRACTITIONER

## 2023-08-30 PROCEDURE — 87086 CULTURE, URINE: ICD-10-PCS | Mod: ,,, | Performed by: CLINICAL MEDICAL LABORATORY

## 2023-08-30 PROCEDURE — 3074F PR MOST RECENT SYSTOLIC BLOOD PRESSURE < 130 MM HG: ICD-10-PCS | Mod: CPTII,,, | Performed by: NURSE PRACTITIONER

## 2023-08-30 PROCEDURE — 99214 PR OFFICE/OUTPT VISIT, EST, LEVL IV, 30-39 MIN: ICD-10-PCS | Mod: ,,, | Performed by: NURSE PRACTITIONER

## 2023-08-30 PROCEDURE — 87077 CULTURE AEROBIC IDENTIFY: CPT | Mod: ,,, | Performed by: CLINICAL MEDICAL LABORATORY

## 2023-08-30 RX ORDER — BUSPIRONE HYDROCHLORIDE 10 MG/1
TABLET ORAL
COMMUNITY
End: 2023-12-06

## 2023-08-30 RX ORDER — LURASIDONE HYDROCHLORIDE 80 MG/1
TABLET, FILM COATED ORAL
COMMUNITY
End: 2023-12-06

## 2023-08-30 RX ORDER — ONDANSETRON 4 MG/1
4 TABLET, ORALLY DISINTEGRATING ORAL EVERY 8 HOURS PRN
Qty: 15 TABLET | Refills: 0 | Status: SHIPPED | OUTPATIENT
Start: 2023-08-30 | End: 2023-12-06

## 2023-08-30 RX ORDER — CHLORDIAZEPOXIDE HYDROCHLORIDE 25 MG/1
1 CAPSULE, GELATIN COATED ORAL
COMMUNITY
End: 2023-12-06

## 2023-08-30 RX ORDER — NITROFURANTOIN 25; 75 MG/1; MG/1
100 CAPSULE ORAL 2 TIMES DAILY
Qty: 14 CAPSULE | Refills: 0 | Status: SHIPPED | OUTPATIENT
Start: 2023-08-30 | End: 2023-09-06

## 2023-08-30 NOTE — PROGRESS NOTES
"Subjective:       Patient ID: Ericka Vo is a 43 y.o. female.    Chief Complaint: Nausea (For days. ), Vomiting (Several times in 4 days. ), and Diarrhea (Several times in 4 days. )    Presents to clinic as above. Son is here with her with same s/s. Nausea for 4 days. Vomiting started yesterday. Vomited several times yesterday and once today. Menses has been irregular this year. She thinks she is perimenopausal. Has had diarrhea a couple of times today. Urine is malodorous. No fever. No URI s/s.    Review of Systems   Constitutional: Negative.    HENT: Negative.     Respiratory: Negative.     Cardiovascular: Negative.    Gastrointestinal:  Positive for abdominal pain, diarrhea, nausea and vomiting. Negative for blood in stool, constipation and melena.   Genitourinary:  Positive for dysuria. Negative for flank pain, frequency, hematuria and urgency.          Reviewed family, medical, surgical, and social history.    Objective:      /84 (BP Location: Left arm, Patient Position: Sitting, BP Method: Medium (Manual))   Pulse 91   Temp 99 °F (37.2 °C) (Oral)   Resp 17   Ht 5' 4" (1.626 m)   Wt 55.3 kg (122 lb)   SpO2 96%   BMI 20.94 kg/m²   Physical Exam  Vitals and nursing note reviewed.   Constitutional:       General: She is not in acute distress.     Appearance: Normal appearance. She is normal weight. She is not ill-appearing, toxic-appearing or diaphoretic.   HENT:      Head: Normocephalic.      Right Ear: Tympanic membrane, ear canal and external ear normal.      Left Ear: Tympanic membrane, ear canal and external ear normal.      Nose: Nose normal.      Mouth/Throat:      Mouth: Mucous membranes are moist.      Pharynx: Oropharynx is clear. No oropharyngeal exudate or posterior oropharyngeal erythema.   Cardiovascular:      Rate and Rhythm: Normal rate and regular rhythm.      Heart sounds: Normal heart sounds.   Pulmonary:      Effort: Pulmonary effort is normal.      Breath sounds: Normal " breath sounds.   Abdominal:      General: Abdomen is flat. Bowel sounds are normal. There is no distension.      Palpations: Abdomen is soft. There is no mass.      Tenderness: There is no abdominal tenderness. There is no right CVA tenderness, left CVA tenderness, guarding or rebound.      Hernia: No hernia is present.   Musculoskeletal:      Cervical back: Normal range of motion and neck supple. No rigidity or tenderness.   Lymphadenopathy:      Cervical: No cervical adenopathy.   Skin:     General: Skin is warm and dry.      Capillary Refill: Capillary refill takes less than 2 seconds.   Neurological:      Mental Status: She is alert and oriented to person, place, and time.   Psychiatric:         Mood and Affect: Mood normal.         Behavior: Behavior normal.         Thought Content: Thought content normal.         Judgment: Judgment normal.            Office Visit on 08/30/2023   Component Date Value Ref Range Status    SARS Coronavirus 2 Antigen 08/30/2023 Negative  Negative Final     Acceptable 08/30/2023 Yes   Final    Color, UA 08/30/2023 Yellow   Final    Spec Grav UA 08/30/2023 1.010   Final    pH, UA 08/30/2023 7.0   Final    WBC, UA 08/30/2023 Small   Final    Nitrite, UA 08/30/2023 Negative   Final    Protein, POC 08/30/2023 Negative   Final    Glucose, UA 08/30/2023 Negative   Final    Ketones, UA 08/30/2023 Negative   Final    Bilirubin, POC 08/30/2023 Negative   Final    Urobilinogen, UA 08/30/2023 1.0   Final    Blood, UA 08/30/2023 Negative   Final    POC Preg Test, Ur 08/30/2023 Negative  Negative Final     Acceptable 08/30/2023 Yes   Final    Rapid Influenza A Ag 08/30/2023 Negative  Negative Final    Rapid Influenza B Ag 08/30/2023 Negative  Negative Final     Acceptable 08/30/2023 Yes   Final      Assessment:       1. Abdominal pain, unspecified abdominal location    2. Contact with and (suspected) exposure to other viral communicable diseases    3.  Dysuria    4. Amenorrhea    5. Nausea and vomiting, unspecified vomiting type        Plan:       Abdominal pain, unspecified abdominal location  -     Cancel: X-Ray Abdomen AP 1 View; Future; Expected date: 08/30/2023    Contact with and (suspected) exposure to other viral communicable diseases  -     SARS Coronavirus 2 Antigen, POCT  -     POCT Influenza A/B Rapid Antigen    Dysuria  -     POCT URINALYSIS W/O SCOPE  -     Urine culture; Future; Expected date: 08/30/2023  -     nitrofurantoin, macrocrystal-monohydrate, (MACROBID) 100 MG capsule; Take 1 capsule (100 mg total) by mouth 2 (two) times daily. for 7 days  Dispense: 14 capsule; Refill: 0    Amenorrhea  -     POCT urine pregnancy    Nausea and vomiting, unspecified vomiting type  -     ondansetron (ZOFRAN-ODT) 4 MG TbDL; Take 1 tablet (4 mg total) by mouth every 8 (eight) hours as needed (N/V).  Dispense: 15 tablet; Refill: 0    Declined KUB.   Discussed likely viral gastroenteritis   F/U with persistent or new s/s and prn          Risks, benefits, and side effects were discussed with the patient. All questions were answered to the fullest satisfaction of the patient, and pt verbalized understanding and agreement to treatment plan. Pt was to call with any new or worsening symptoms, or present to the ER.

## 2023-09-01 LAB — UA COMPLETE W REFLEX CULTURE PNL UR: ABNORMAL

## 2023-09-13 ENCOUNTER — TELEPHONE (OUTPATIENT)
Dept: FAMILY MEDICINE | Facility: CLINIC | Age: 43
End: 2023-09-13
Payer: MEDICAID

## 2023-09-13 NOTE — TELEPHONE ENCOUNTER
Pt notified. Voiced understanding. ----- Message from BENOIT Olivier sent at 9/4/2023  8:31 AM CDT -----  On macrobid

## 2023-10-11 ENCOUNTER — OFFICE VISIT (OUTPATIENT)
Dept: FAMILY MEDICINE | Facility: CLINIC | Age: 43
End: 2023-10-11
Payer: MEDICAID

## 2023-10-11 ENCOUNTER — APPOINTMENT (OUTPATIENT)
Dept: RADIOLOGY | Facility: CLINIC | Age: 43
End: 2023-10-11
Attending: FAMILY MEDICINE
Payer: MEDICAID

## 2023-10-11 VITALS
BODY MASS INDEX: 21.2 KG/M2 | HEART RATE: 100 BPM | HEIGHT: 64 IN | RESPIRATION RATE: 18 BRPM | DIASTOLIC BLOOD PRESSURE: 80 MMHG | WEIGHT: 124.19 LBS | TEMPERATURE: 99 F | OXYGEN SATURATION: 100 % | SYSTOLIC BLOOD PRESSURE: 122 MMHG

## 2023-10-11 DIAGNOSIS — Z72.51 HIGH RISK HETEROSEXUAL BEHAVIOR: ICD-10-CM

## 2023-10-11 DIAGNOSIS — R10.84 GENERALIZED ABDOMINAL PAIN: ICD-10-CM

## 2023-10-11 DIAGNOSIS — R10.84 GENERALIZED ABDOMINAL PAIN: Primary | ICD-10-CM

## 2023-10-11 PROCEDURE — 74018 RADEX ABDOMEN 1 VIEW: CPT | Mod: 26,,, | Performed by: RADIOLOGY

## 2023-10-11 PROCEDURE — 87591 CHLAMYDIA/GONORRHOEAE(GC), PCR: ICD-10-PCS | Mod: ,,, | Performed by: CLINICAL MEDICAL LABORATORY

## 2023-10-11 PROCEDURE — 87661 TRICHOMONAS VAGINALIS BY PCR: ICD-10-PCS | Mod: ,,, | Performed by: CLINICAL MEDICAL LABORATORY

## 2023-10-11 PROCEDURE — 3074F SYST BP LT 130 MM HG: CPT | Mod: CPTII,,, | Performed by: FAMILY MEDICINE

## 2023-10-11 PROCEDURE — 87491 CHLMYD TRACH DNA AMP PROBE: CPT | Mod: ,,, | Performed by: CLINICAL MEDICAL LABORATORY

## 2023-10-11 PROCEDURE — 74018 RADEX ABDOMEN 1 VIEW: CPT | Mod: TC,RHCUB | Performed by: FAMILY MEDICINE

## 2023-10-11 PROCEDURE — 1160F RVW MEDS BY RX/DR IN RCRD: CPT | Mod: CPTII,,, | Performed by: FAMILY MEDICINE

## 2023-10-11 PROCEDURE — 1159F PR MEDICATION LIST DOCUMENTED IN MEDICAL RECORD: ICD-10-PCS | Mod: CPTII,,, | Performed by: FAMILY MEDICINE

## 2023-10-11 PROCEDURE — 87389 HIV 1 / 2 ANTIBODY: ICD-10-PCS | Mod: ,,, | Performed by: CLINICAL MEDICAL LABORATORY

## 2023-10-11 PROCEDURE — 80053 COMPREHEN METABOLIC PANEL: CPT | Mod: ,,, | Performed by: CLINICAL MEDICAL LABORATORY

## 2023-10-11 PROCEDURE — 3079F PR MOST RECENT DIASTOLIC BLOOD PRESSURE 80-89 MM HG: ICD-10-PCS | Mod: CPTII,,, | Performed by: FAMILY MEDICINE

## 2023-10-11 PROCEDURE — 3008F PR BODY MASS INDEX (BMI) DOCUMENTED: ICD-10-PCS | Mod: CPTII,,, | Performed by: FAMILY MEDICINE

## 2023-10-11 PROCEDURE — 74018 XR KUB: ICD-10-PCS | Mod: 26,,, | Performed by: RADIOLOGY

## 2023-10-11 PROCEDURE — 3079F DIAST BP 80-89 MM HG: CPT | Mod: CPTII,,, | Performed by: FAMILY MEDICINE

## 2023-10-11 PROCEDURE — 85025 COMPLETE CBC W/AUTO DIFF WBC: CPT | Mod: ,,, | Performed by: CLINICAL MEDICAL LABORATORY

## 2023-10-11 PROCEDURE — 99214 PR OFFICE/OUTPT VISIT, EST, LEVL IV, 30-39 MIN: ICD-10-PCS | Mod: ,,, | Performed by: FAMILY MEDICINE

## 2023-10-11 PROCEDURE — 85025 CBC WITH DIFFERENTIAL: ICD-10-PCS | Mod: ,,, | Performed by: CLINICAL MEDICAL LABORATORY

## 2023-10-11 PROCEDURE — 87661 TRICHOMONAS VAGINALIS AMPLIF: CPT | Mod: ,,, | Performed by: CLINICAL MEDICAL LABORATORY

## 2023-10-11 PROCEDURE — 83690 ASSAY OF LIPASE: CPT | Mod: ,,, | Performed by: CLINICAL MEDICAL LABORATORY

## 2023-10-11 PROCEDURE — 87389 HIV-1 AG W/HIV-1&-2 AB AG IA: CPT | Mod: ,,, | Performed by: CLINICAL MEDICAL LABORATORY

## 2023-10-11 PROCEDURE — 87591 N.GONORRHOEAE DNA AMP PROB: CPT | Mod: ,,, | Performed by: CLINICAL MEDICAL LABORATORY

## 2023-10-11 PROCEDURE — 86780 TREPONEMA PALLIDUM (SYPHILIS) ANTIBODY: ICD-10-PCS | Mod: ,,, | Performed by: CLINICAL MEDICAL LABORATORY

## 2023-10-11 PROCEDURE — 83690 LIPASE: ICD-10-PCS | Mod: ,,, | Performed by: CLINICAL MEDICAL LABORATORY

## 2023-10-11 PROCEDURE — 99214 OFFICE O/P EST MOD 30 MIN: CPT | Mod: ,,, | Performed by: FAMILY MEDICINE

## 2023-10-11 PROCEDURE — 80053 COMPREHENSIVE METABOLIC PANEL: ICD-10-PCS | Mod: ,,, | Performed by: CLINICAL MEDICAL LABORATORY

## 2023-10-11 PROCEDURE — 3008F BODY MASS INDEX DOCD: CPT | Mod: CPTII,,, | Performed by: FAMILY MEDICINE

## 2023-10-11 PROCEDURE — 86780 TREPONEMA PALLIDUM: CPT | Mod: ,,, | Performed by: CLINICAL MEDICAL LABORATORY

## 2023-10-11 PROCEDURE — 3074F PR MOST RECENT SYSTOLIC BLOOD PRESSURE < 130 MM HG: ICD-10-PCS | Mod: CPTII,,, | Performed by: FAMILY MEDICINE

## 2023-10-11 PROCEDURE — 1160F PR REVIEW ALL MEDS BY PRESCRIBER/CLIN PHARMACIST DOCUMENTED: ICD-10-PCS | Mod: CPTII,,, | Performed by: FAMILY MEDICINE

## 2023-10-11 PROCEDURE — 87491 CHLAMYDIA/GONORRHOEAE(GC), PCR: ICD-10-PCS | Mod: ,,, | Performed by: CLINICAL MEDICAL LABORATORY

## 2023-10-11 PROCEDURE — 1159F MED LIST DOCD IN RCRD: CPT | Mod: CPTII,,, | Performed by: FAMILY MEDICINE

## 2023-10-11 RX ORDER — PANTOPRAZOLE SODIUM 20 MG/1
20 TABLET, DELAYED RELEASE ORAL DAILY
Qty: 30 TABLET | Refills: 11 | Status: SHIPPED | OUTPATIENT
Start: 2023-10-11 | End: 2023-12-06

## 2023-10-11 NOTE — PROGRESS NOTES
Subjective:       Patient ID: Ericka Vo is a 43 y.o. female.    Chief Complaint: Nausea (Ongoing on and off for a few weeks. Would like blood work done)    Pt with nausea intermittent x 3 weeks, occurring almost daily. Mild generalized abdominal pain intermittent as well. Hx of gerd.     Nausea  Associated symptoms include abdominal pain and nausea. Pertinent negatives include no arthralgias, change in bowel habit, chest pain, chills, congestion, coughing, diaphoresis, fatigue, fever, headaches, joint swelling, myalgias, neck pain, numbness, rash, sore throat, vertigo, vomiting or weakness.     Review of Systems   Constitutional:  Negative for activity change, appetite change, chills, diaphoresis, fatigue, fever and unexpected weight change.   HENT:  Negative for nasal congestion, dental problem, drooling, ear discharge, ear pain, facial swelling, hearing loss, mouth sores, nosebleeds, postnasal drip, rhinorrhea, sinus pressure/congestion, sneezing, sore throat, tinnitus, trouble swallowing, voice change and goiter.    Eyes:  Negative for photophobia, discharge, itching and visual disturbance.   Respiratory:  Negative for apnea, cough, choking, chest tightness, shortness of breath, wheezing and stridor.    Cardiovascular:  Negative for chest pain, palpitations, leg swelling and claudication.   Gastrointestinal:  Positive for abdominal pain and nausea. Negative for abdominal distention, anal bleeding, blood in stool, change in bowel habit, constipation, diarrhea and vomiting.   Endocrine: Negative for cold intolerance, heat intolerance, polydipsia, polyphagia and polyuria.   Genitourinary:  Negative for bladder incontinence, decreased urine volume, difficulty urinating, dysuria, enuresis, flank pain, frequency, hematuria, nocturia, pelvic pain and urgency.   Musculoskeletal:  Negative for arthralgias, back pain, gait problem, joint swelling, leg pain, myalgias, neck pain, neck stiffness and joint deformity.    Integumentary:  Negative for pallor, rash, wound, breast mass and breast tenderness.   Allergic/Immunologic: Negative for environmental allergies, food allergies and immunocompromised state.   Neurological:  Negative for dizziness, vertigo, tremors, seizures, syncope, facial asymmetry, speech difficulty, weakness, light-headedness, numbness, headaches, memory loss and coordination difficulties.   Hematological:  Negative for adenopathy. Does not bruise/bleed easily.   Psychiatric/Behavioral:  Negative for agitation, behavioral problems, confusion, decreased concentration, dysphoric mood, hallucinations, self-injury, sleep disturbance and suicidal ideas. The patient is not nervous/anxious and is not hyperactive.    Breast: Negative for mass and tenderness        Objective:      Physical Exam  Vitals reviewed.   Constitutional:       Appearance: Normal appearance.   HENT:      Head: Normocephalic and atraumatic.      Right Ear: Tympanic membrane, ear canal and external ear normal.      Left Ear: Tympanic membrane, ear canal and external ear normal.      Nose: Nose normal.      Mouth/Throat:      Mouth: Mucous membranes are moist.      Pharynx: Oropharynx is clear.   Eyes:      Extraocular Movements: Extraocular movements intact.      Conjunctiva/sclera: Conjunctivae normal.      Pupils: Pupils are equal, round, and reactive to light.   Cardiovascular:      Rate and Rhythm: Normal rate and regular rhythm.      Pulses: Normal pulses.      Heart sounds: Normal heart sounds.   Pulmonary:      Effort: Pulmonary effort is normal.      Breath sounds: Normal breath sounds.   Abdominal:      General: Bowel sounds are normal.      Palpations: Abdomen is soft.   Musculoskeletal:         General: Normal range of motion.      Cervical back: Normal range of motion and neck supple.   Skin:     General: Skin is warm and dry.   Neurological:      General: No focal deficit present.      Mental Status: She is alert. Mental status is  at baseline.   Psychiatric:         Mood and Affect: Mood normal.         Behavior: Behavior normal.         Thought Content: Thought content normal.         Judgment: Judgment normal.         Assessment:       1. Generalized abdominal pain    2. High risk heterosexual behavior        Plan:     Generalized abdominal pain  -     X-Ray KUB; Future; Expected date: 10/11/2023  -     CBC Auto Differential; Future; Expected date: 10/11/2023  -     Comprehensive Metabolic Panel; Future; Expected date: 10/11/2023  -     Lipase; Future; Expected date: 10/11/2023    High risk heterosexual behavior  -     Chlamydia/GC, PCR; Future; Expected date: 10/11/2023  -     Trichomonas vaginalis by PCR; Future; Expected date: 10/11/2023  -     HIV 1/2 Ag/Ab (4th Gen); Future; Expected date: 10/11/2023  -     Syphilis Antibody with reflex to RPR; Future; Expected date: 10/11/2023    Other orders  -     pantoprazole (PROTONIX) 20 MG tablet; Take 1 tablet (20 mg total) by mouth once daily.  Dispense: 30 tablet; Refill: 11

## 2023-10-13 LAB
ALBUMIN SERPL BCP-MCNC: 4.1 G/DL (ref 3.5–5)
ALBUMIN/GLOB SERPL: 1 {RATIO}
ALP SERPL-CCNC: 103 U/L (ref 39–100)
ALT SERPL W P-5'-P-CCNC: 60 U/L (ref 13–56)
ANION GAP SERPL CALCULATED.3IONS-SCNC: 11 MMOL/L (ref 7–16)
AST SERPL W P-5'-P-CCNC: 228 U/L (ref 15–37)
BASOPHILS # BLD AUTO: 0.06 K/UL (ref 0–0.2)
BASOPHILS NFR BLD AUTO: 1.4 % (ref 0–1)
BILIRUB SERPL-MCNC: 0.6 MG/DL (ref ?–1.2)
BUN SERPL-MCNC: 2 MG/DL (ref 7–18)
BUN/CREAT SERPL: 3 (ref 6–20)
CALCIUM SERPL-MCNC: 8.3 MG/DL (ref 8.5–10.1)
CHLORIDE SERPL-SCNC: 87 MMOL/L (ref 98–107)
CO2 SERPL-SCNC: 35 MMOL/L (ref 21–32)
CREAT SERPL-MCNC: 0.76 MG/DL (ref 0.55–1.02)
DIFFERENTIAL METHOD BLD: ABNORMAL
EGFR (NO RACE VARIABLE) (RUSH/TITUS): 99 ML/MIN/1.73M2
EOSINOPHIL # BLD AUTO: 0.06 K/UL (ref 0–0.5)
EOSINOPHIL NFR BLD AUTO: 1.4 % (ref 1–4)
ERYTHROCYTE [DISTWIDTH] IN BLOOD BY AUTOMATED COUNT: 12.3 % (ref 11.5–14.5)
GLOBULIN SER-MCNC: 4 G/DL (ref 2–4)
GLUCOSE SERPL-MCNC: 86 MG/DL (ref 74–106)
HCT VFR BLD AUTO: 41 % (ref 38–47)
HGB BLD-MCNC: 14.7 G/DL (ref 12–16)
HIV 1+O+2 AB SERPL QL: NORMAL
IMM GRANULOCYTES # BLD AUTO: 0.02 K/UL (ref 0–0.04)
IMM GRANULOCYTES NFR BLD: 0.5 % (ref 0–0.4)
LIPASE SERPL-CCNC: 15 U/L (ref 73–393)
LYMPHOCYTES # BLD AUTO: 1.43 K/UL (ref 1–4.8)
LYMPHOCYTES NFR BLD AUTO: 32.2 % (ref 27–41)
MACROCYTES BLD QL SMEAR: NORMAL
MCH RBC QN AUTO: 38.5 PG (ref 27–31)
MCHC RBC AUTO-ENTMCNC: 35.9 G/DL (ref 32–36)
MCV RBC AUTO: 107.3 FL (ref 80–96)
MONOCYTES # BLD AUTO: 0.48 K/UL (ref 0–0.8)
MONOCYTES NFR BLD AUTO: 10.8 % (ref 2–6)
MPC BLD CALC-MCNC: 11.2 FL (ref 9.4–12.4)
NEUTROPHILS # BLD AUTO: 2.39 K/UL (ref 1.8–7.7)
NEUTROPHILS NFR BLD AUTO: 53.7 % (ref 53–65)
NRBC # BLD AUTO: 0 X10E3/UL
NRBC, AUTO (.00): 0 %
PLATELET # BLD AUTO: 136 K/UL (ref 150–400)
PLATELET MORPHOLOGY: NORMAL
POTASSIUM SERPL-SCNC: 2.4 MMOL/L (ref 3.5–5.1)
PROT SERPL-MCNC: 8.1 G/DL (ref 6.4–8.2)
RBC # BLD AUTO: 3.82 M/UL (ref 4.2–5.4)
SODIUM SERPL-SCNC: 131 MMOL/L (ref 136–145)
SYPHILIS AB INTERPRETATION: NORMAL
WBC # BLD AUTO: 4.44 K/UL (ref 4.5–11)

## 2023-10-14 LAB
CHLAMYDIA BY PCR: NEGATIVE
N. GONORRHOEAE (GC) BY PCR: NEGATIVE
TRICHOMONAS NAT: NEGATIVE

## 2023-10-17 ENCOUNTER — TELEPHONE (OUTPATIENT)
Dept: FAMILY MEDICINE | Facility: CLINIC | Age: 43
End: 2023-10-17
Payer: MEDICAID

## 2023-10-17 NOTE — TELEPHONE ENCOUNTER
Message given to provider. ----- Message from Kelley Hernandez sent at 10/16/2023  1:11 PM CDT -----  Regarding: medicine  Patient saw jocelynn on the 11th of oct calling to say he did not send in her zofran  [phone]

## 2023-11-30 ENCOUNTER — OFFICE VISIT (OUTPATIENT)
Dept: PRIMARY CARE CLINIC | Facility: CLINIC | Age: 43
End: 2023-11-30
Payer: MEDICAID

## 2023-11-30 VITALS
SYSTOLIC BLOOD PRESSURE: 118 MMHG | HEART RATE: 124 BPM | HEIGHT: 64 IN | WEIGHT: 118 LBS | TEMPERATURE: 99 F | OXYGEN SATURATION: 97 % | BODY MASS INDEX: 20.14 KG/M2 | DIASTOLIC BLOOD PRESSURE: 82 MMHG

## 2023-11-30 DIAGNOSIS — J02.9 ACUTE PHARYNGITIS, UNSPECIFIED ETIOLOGY: Primary | ICD-10-CM

## 2023-11-30 DIAGNOSIS — R11.0 NAUSEA: ICD-10-CM

## 2023-11-30 PROCEDURE — 3008F PR BODY MASS INDEX (BMI) DOCUMENTED: ICD-10-PCS | Mod: CPTII,,, | Performed by: NURSE PRACTITIONER

## 2023-11-30 PROCEDURE — 1160F PR REVIEW ALL MEDS BY PRESCRIBER/CLIN PHARMACIST DOCUMENTED: ICD-10-PCS | Mod: CPTII,,, | Performed by: NURSE PRACTITIONER

## 2023-11-30 PROCEDURE — 1160F RVW MEDS BY RX/DR IN RCRD: CPT | Mod: CPTII,,, | Performed by: NURSE PRACTITIONER

## 2023-11-30 PROCEDURE — 3074F SYST BP LT 130 MM HG: CPT | Mod: CPTII,,, | Performed by: NURSE PRACTITIONER

## 2023-11-30 PROCEDURE — 1159F PR MEDICATION LIST DOCUMENTED IN MEDICAL RECORD: ICD-10-PCS | Mod: CPTII,,, | Performed by: NURSE PRACTITIONER

## 2023-11-30 PROCEDURE — 3079F DIAST BP 80-89 MM HG: CPT | Mod: CPTII,,, | Performed by: NURSE PRACTITIONER

## 2023-11-30 PROCEDURE — 99213 OFFICE O/P EST LOW 20 MIN: CPT | Mod: 25,,, | Performed by: NURSE PRACTITIONER

## 2023-11-30 PROCEDURE — 3079F PR MOST RECENT DIASTOLIC BLOOD PRESSURE 80-89 MM HG: ICD-10-PCS | Mod: CPTII,,, | Performed by: NURSE PRACTITIONER

## 2023-11-30 PROCEDURE — 3008F BODY MASS INDEX DOCD: CPT | Mod: CPTII,,, | Performed by: NURSE PRACTITIONER

## 2023-11-30 PROCEDURE — 96372 PR INJECTION,THERAP/PROPH/DIAG2ST, IM OR SUBCUT: ICD-10-PCS | Mod: ,,, | Performed by: NURSE PRACTITIONER

## 2023-11-30 PROCEDURE — 3074F PR MOST RECENT SYSTOLIC BLOOD PRESSURE < 130 MM HG: ICD-10-PCS | Mod: CPTII,,, | Performed by: NURSE PRACTITIONER

## 2023-11-30 PROCEDURE — 99213 PR OFFICE/OUTPT VISIT, EST, LEVL III, 20-29 MIN: ICD-10-PCS | Mod: 25,,, | Performed by: NURSE PRACTITIONER

## 2023-11-30 PROCEDURE — 1159F MED LIST DOCD IN RCRD: CPT | Mod: CPTII,,, | Performed by: NURSE PRACTITIONER

## 2023-11-30 PROCEDURE — 96372 THER/PROPH/DIAG INJ SC/IM: CPT | Mod: ,,, | Performed by: NURSE PRACTITIONER

## 2023-11-30 RX ORDER — METHYLPREDNISOLONE ACETATE 40 MG/ML
40 INJECTION, SUSPENSION INTRA-ARTICULAR; INTRALESIONAL; INTRAMUSCULAR; SOFT TISSUE
Status: COMPLETED | OUTPATIENT
Start: 2023-11-30 | End: 2023-11-30

## 2023-11-30 RX ORDER — AZITHROMYCIN 250 MG/1
TABLET, FILM COATED ORAL
Qty: 6 TABLET | Refills: 0 | Status: SHIPPED | OUTPATIENT
Start: 2023-11-30 | End: 2023-12-06

## 2023-11-30 RX ORDER — DEXAMETHASONE SODIUM PHOSPHATE 4 MG/ML
4 INJECTION, SOLUTION INTRA-ARTICULAR; INTRALESIONAL; INTRAMUSCULAR; INTRAVENOUS; SOFT TISSUE
Status: COMPLETED | OUTPATIENT
Start: 2023-11-30 | End: 2023-11-30

## 2023-11-30 RX ORDER — ONDANSETRON HYDROCHLORIDE 8 MG/1
8 TABLET, FILM COATED ORAL 2 TIMES DAILY
Qty: 15 TABLET | Refills: 0 | Status: SHIPPED | OUTPATIENT
Start: 2023-11-30

## 2023-11-30 RX ADMIN — METHYLPREDNISOLONE ACETATE 40 MG: 40 INJECTION, SUSPENSION INTRA-ARTICULAR; INTRALESIONAL; INTRAMUSCULAR; SOFT TISSUE at 01:11

## 2023-11-30 RX ADMIN — DEXAMETHASONE SODIUM PHOSPHATE 4 MG: 4 INJECTION, SOLUTION INTRA-ARTICULAR; INTRALESIONAL; INTRAMUSCULAR; INTRAVENOUS; SOFT TISSUE at 01:11

## 2023-11-30 NOTE — PROGRESS NOTES
Subjective     Patient ID: Ericka Vo is a 43 y.o. female.    Chief Complaint: Sore Throat (Pt complains of sore throat and vomiting x 2 days. She stated that it feels like her throat is closing. /Pt Concerns abdominal cramp.)    Pt presents with sore throat, vomiting and throat swelling x 2 days.       Review of Systems   Constitutional:  Negative for activity change, appetite change, chills, fatigue and fever.   HENT:  Positive for sore throat. Negative for nasal congestion, ear discharge, nosebleeds, postnasal drip, rhinorrhea, sinus pressure/congestion, sneezing and tinnitus.    Eyes:  Negative for pain, discharge, redness and itching.   Respiratory:  Negative for cough, choking, chest tightness, shortness of breath and wheezing.    Cardiovascular:  Negative for chest pain.   Gastrointestinal:  Positive for vomiting. Negative for abdominal distention, abdominal pain, blood in stool, change in bowel habit, constipation, diarrhea and nausea.   Genitourinary:  Negative for decreased urine volume, dysuria, flank pain and frequency.   Musculoskeletal:  Negative for back pain and gait problem.   Integumentary:  Negative for wound, breast mass and breast discharge.   Allergic/Immunologic: Negative for immunocompromised state.   Neurological:  Negative for dizziness, light-headedness and headaches.   Psychiatric/Behavioral:  Negative for agitation, behavioral problems and hallucinations.    Breast: Negative for mass         Objective     Physical Exam  Vitals and nursing note reviewed.   Constitutional:       Appearance: Normal appearance.   HENT:      Head: Normocephalic.      Right Ear: Tympanic membrane normal.      Left Ear: Tympanic membrane normal.      Mouth/Throat:      Pharynx: Posterior oropharyngeal erythema present.   Eyes:      Conjunctiva/sclera: Conjunctivae normal.      Pupils: Pupils are equal, round, and reactive to light.   Cardiovascular:      Rate and Rhythm: Normal rate and regular rhythm.       Heart sounds: Normal heart sounds.   Pulmonary:      Effort: Pulmonary effort is normal.      Breath sounds: Normal breath sounds.   Musculoskeletal:         General: Normal range of motion.   Neurological:      Mental Status: She is alert and oriented to person, place, and time.   Psychiatric:         Mood and Affect: Mood normal.         Behavior: Behavior normal.            Assessment and Plan     1. Acute pharyngitis, unspecified etiology  -     dexAMETHasone injection 4 mg  -     methylPREDNISolone acetate injection 40 mg  -     azithromycin (Z-DEVEN) 250 MG tablet; 2 tablets on day 1 and 1 tablet on days 2-5  Dispense: 6 tablet; Refill: 0    2. Nausea  -     ondansetron (ZOFRAN) 8 MG tablet; Take 1 tablet (8 mg total) by mouth 2 (two) times daily.  Dispense: 15 tablet; Refill: 0        Change toothbrush in 2 days. Go to the er with any trouble breathing or swallowing. Drink plenty of fluids.          Follow up if symptoms worsen or fail to improve.

## 2023-12-06 ENCOUNTER — OFFICE VISIT (OUTPATIENT)
Dept: PRIMARY CARE CLINIC | Facility: CLINIC | Age: 43
End: 2023-12-06
Payer: MEDICAID

## 2023-12-06 VITALS
RESPIRATION RATE: 16 BRPM | HEART RATE: 98 BPM | SYSTOLIC BLOOD PRESSURE: 124 MMHG | OXYGEN SATURATION: 100 % | BODY MASS INDEX: 21 KG/M2 | DIASTOLIC BLOOD PRESSURE: 60 MMHG | HEIGHT: 64 IN | WEIGHT: 123 LBS | TEMPERATURE: 100 F

## 2023-12-06 DIAGNOSIS — J06.9 ACUTE UPPER RESPIRATORY INFECTION: Primary | ICD-10-CM

## 2023-12-06 PROCEDURE — 99213 OFFICE O/P EST LOW 20 MIN: CPT | Mod: ,,, | Performed by: NURSE PRACTITIONER

## 2023-12-06 PROCEDURE — 1159F PR MEDICATION LIST DOCUMENTED IN MEDICAL RECORD: ICD-10-PCS | Mod: CPTII,,, | Performed by: NURSE PRACTITIONER

## 2023-12-06 PROCEDURE — 3008F BODY MASS INDEX DOCD: CPT | Mod: CPTII,,, | Performed by: NURSE PRACTITIONER

## 2023-12-06 PROCEDURE — 3078F DIAST BP <80 MM HG: CPT | Mod: CPTII,,, | Performed by: NURSE PRACTITIONER

## 2023-12-06 PROCEDURE — 3074F SYST BP LT 130 MM HG: CPT | Mod: CPTII,,, | Performed by: NURSE PRACTITIONER

## 2023-12-06 PROCEDURE — 1160F RVW MEDS BY RX/DR IN RCRD: CPT | Mod: CPTII,,, | Performed by: NURSE PRACTITIONER

## 2023-12-06 PROCEDURE — 3074F PR MOST RECENT SYSTOLIC BLOOD PRESSURE < 130 MM HG: ICD-10-PCS | Mod: CPTII,,, | Performed by: NURSE PRACTITIONER

## 2023-12-06 PROCEDURE — 99213 PR OFFICE/OUTPT VISIT, EST, LEVL III, 20-29 MIN: ICD-10-PCS | Mod: ,,, | Performed by: NURSE PRACTITIONER

## 2023-12-06 PROCEDURE — 1159F MED LIST DOCD IN RCRD: CPT | Mod: CPTII,,, | Performed by: NURSE PRACTITIONER

## 2023-12-06 PROCEDURE — 3008F PR BODY MASS INDEX (BMI) DOCUMENTED: ICD-10-PCS | Mod: CPTII,,, | Performed by: NURSE PRACTITIONER

## 2023-12-06 PROCEDURE — 1160F PR REVIEW ALL MEDS BY PRESCRIBER/CLIN PHARMACIST DOCUMENTED: ICD-10-PCS | Mod: CPTII,,, | Performed by: NURSE PRACTITIONER

## 2023-12-06 PROCEDURE — 3078F PR MOST RECENT DIASTOLIC BLOOD PRESSURE < 80 MM HG: ICD-10-PCS | Mod: CPTII,,, | Performed by: NURSE PRACTITIONER

## 2023-12-06 RX ORDER — FLUCONAZOLE 150 MG/1
150 TABLET ORAL WEEKLY
Qty: 2 TABLET | Refills: 0 | Status: SHIPPED | OUTPATIENT
Start: 2023-12-06 | End: 2024-01-02

## 2023-12-06 RX ORDER — BENZONATATE 200 MG/1
200 CAPSULE ORAL 3 TIMES DAILY PRN
Qty: 30 CAPSULE | Refills: 0 | Status: SHIPPED | OUTPATIENT
Start: 2023-12-06 | End: 2023-12-16

## 2023-12-06 RX ORDER — ALBUTEROL SULFATE 90 UG/1
2 AEROSOL, METERED RESPIRATORY (INHALATION) EVERY 6 HOURS PRN
Qty: 18 G | Refills: 0 | Status: SHIPPED | OUTPATIENT
Start: 2023-12-06 | End: 2024-12-05

## 2023-12-06 RX ORDER — PROMETHAZINE HYDROCHLORIDE AND DEXTROMETHORPHAN HYDROBROMIDE 6.25; 15 MG/5ML; MG/5ML
5 SYRUP ORAL EVERY 4 HOURS PRN
Qty: 240 ML | Refills: 0 | Status: SHIPPED | OUTPATIENT
Start: 2023-12-06 | End: 2023-12-16

## 2023-12-06 NOTE — PROGRESS NOTES
Subjective     Patient ID: Ericka Vo is a 43 y.o. female.    Chief Complaint: Cough, Nasal Congestion, and Headache    Pt presents with persisting cough, congestion and headache. She took the zpack as directed.     Review of Systems   Constitutional:  Negative for activity change, appetite change, chills, fatigue and fever.   HENT:  Positive for nasal congestion and sinus pressure/congestion. Negative for ear discharge, nosebleeds, postnasal drip, rhinorrhea, sneezing, sore throat and tinnitus.    Eyes:  Negative for pain, discharge, redness and itching.   Respiratory:  Positive for cough. Negative for choking, chest tightness, shortness of breath and wheezing.    Cardiovascular:  Negative for chest pain.   Gastrointestinal:  Negative for abdominal distention, abdominal pain, blood in stool, change in bowel habit, constipation, diarrhea, nausea and vomiting.   Genitourinary:  Negative for decreased urine volume, dysuria, flank pain, frequency, menstrual irregularity and menstrual problem.   Musculoskeletal:  Negative for back pain and gait problem.   Integumentary:  Negative for wound, breast mass and breast discharge.   Allergic/Immunologic: Negative for immunocompromised state.   Neurological:  Positive for headaches. Negative for dizziness and light-headedness.   Psychiatric/Behavioral:  Negative for agitation, behavioral problems and hallucinations.    Breast: Negative for mass         Objective     Physical Exam  Vitals and nursing note reviewed.   Constitutional:       Appearance: Normal appearance.   HENT:      Head: Normocephalic.      Right Ear: Tympanic membrane normal.      Left Ear: Tympanic membrane normal.      Nose: Nose normal.      Mouth/Throat:      Mouth: Mucous membranes are moist.   Eyes:      Conjunctiva/sclera: Conjunctivae normal.   Cardiovascular:      Rate and Rhythm: Normal rate and regular rhythm.      Heart sounds: Normal heart sounds.   Pulmonary:      Effort: Pulmonary effort  is normal.      Breath sounds: Normal breath sounds.   Musculoskeletal:         General: Normal range of motion.   Neurological:      Mental Status: She is alert and oriented to person, place, and time.   Psychiatric:         Mood and Affect: Mood normal.         Behavior: Behavior normal.          Assessment and Plan     1. Acute upper respiratory infection  -     promethazine-dextromethorphan (PROMETHAZINE-DM) 6.25-15 mg/5 mL Syrp; Take 5 mLs by mouth every 4 (four) hours as needed (cough). Do not take while driving  Dispense: 240 mL; Refill: 0  -     benzonatate (TESSALON) 200 MG capsule; Take 1 capsule (200 mg total) by mouth 3 (three) times daily as needed for Cough.  Dispense: 30 capsule; Refill: 0  -     albuterol (VENTOLIN HFA) 90 mcg/actuation inhaler; Inhale 2 puffs into the lungs every 6 (six) hours as needed for Wheezing. Rescue  Dispense: 18 g; Refill: 0    Other orders  -     fluconazole (DIFLUCAN) 150 MG Tab; Take 1 tablet (150 mg total) by mouth once a week.  Dispense: 2 tablet; Refill: 0        Notify clinic if symptoms worsen or persist.          Follow up if symptoms worsen or fail to improve.

## 2024-01-02 ENCOUNTER — OFFICE VISIT (OUTPATIENT)
Dept: PRIMARY CARE CLINIC | Facility: CLINIC | Age: 44
End: 2024-01-02
Payer: MEDICAID

## 2024-01-02 VITALS
OXYGEN SATURATION: 97 % | TEMPERATURE: 98 F | HEART RATE: 77 BPM | HEIGHT: 64 IN | DIASTOLIC BLOOD PRESSURE: 66 MMHG | BODY MASS INDEX: 20.66 KG/M2 | WEIGHT: 121 LBS | SYSTOLIC BLOOD PRESSURE: 112 MMHG

## 2024-01-02 DIAGNOSIS — F17.200 SMOKER: Primary | ICD-10-CM

## 2024-01-02 DIAGNOSIS — I10 HYPERTENSION, UNSPECIFIED TYPE: ICD-10-CM

## 2024-01-02 DIAGNOSIS — R74.8 ELEVATED LIVER ENZYMES: ICD-10-CM

## 2024-01-02 PROCEDURE — 99214 OFFICE O/P EST MOD 30 MIN: CPT | Mod: ,,, | Performed by: NURSE PRACTITIONER

## 2024-01-02 PROCEDURE — 3074F SYST BP LT 130 MM HG: CPT | Mod: CPTII,,, | Performed by: NURSE PRACTITIONER

## 2024-01-02 PROCEDURE — 3078F DIAST BP <80 MM HG: CPT | Mod: CPTII,,, | Performed by: NURSE PRACTITIONER

## 2024-01-02 PROCEDURE — 1159F MED LIST DOCD IN RCRD: CPT | Mod: CPTII,,, | Performed by: NURSE PRACTITIONER

## 2024-01-02 PROCEDURE — 1160F RVW MEDS BY RX/DR IN RCRD: CPT | Mod: CPTII,,, | Performed by: NURSE PRACTITIONER

## 2024-01-02 PROCEDURE — 3008F BODY MASS INDEX DOCD: CPT | Mod: CPTII,,, | Performed by: NURSE PRACTITIONER

## 2024-01-02 RX ORDER — VARENICLINE TARTRATE 0.5 (11)-1
KIT ORAL
Qty: 1 EACH | Refills: 0 | Status: SHIPPED | OUTPATIENT
Start: 2024-01-02

## 2024-01-02 RX ORDER — VALACYCLOVIR HYDROCHLORIDE 500 MG/1
500 TABLET, FILM COATED ORAL 2 TIMES DAILY
COMMUNITY
Start: 2023-12-30

## 2024-01-02 NOTE — PROGRESS NOTES
Subjective     Patient ID: Ericka Vo is a 43 y.o. female.    Chief Complaint: Sinusitis ( Pt Complains of cough and sneezing. Request for Chantex)    Pt presents with requests for chantix and buspar for anxiety.       Review of Systems   Constitutional:  Negative for activity change, appetite change, chills, fatigue and fever.   HENT:  Negative for nasal congestion, ear discharge, nosebleeds, postnasal drip, rhinorrhea, sinus pressure/congestion, sneezing, sore throat and tinnitus.    Eyes:  Negative for pain, discharge, redness and itching.   Respiratory:  Negative for cough, choking, chest tightness, shortness of breath and wheezing.    Cardiovascular:  Negative for chest pain.   Gastrointestinal:  Negative for abdominal distention, abdominal pain, blood in stool, change in bowel habit, constipation, diarrhea, nausea and vomiting.   Genitourinary:  Negative for decreased urine volume, dysuria, flank pain and frequency.   Musculoskeletal:  Negative for back pain and gait problem.   Integumentary:  Negative for wound, breast mass and breast discharge.   Allergic/Immunologic: Negative for immunocompromised state.   Neurological:  Negative for dizziness, light-headedness and headaches.   Psychiatric/Behavioral:  Negative for agitation, behavioral problems and hallucinations. The patient is nervous/anxious.    Breast: Negative for mass         Objective     Physical Exam  Vitals and nursing note reviewed.   Constitutional:       Appearance: Normal appearance.   Cardiovascular:      Rate and Rhythm: Normal rate and regular rhythm.      Heart sounds: Normal heart sounds.   Pulmonary:      Effort: Pulmonary effort is normal.      Breath sounds: Normal breath sounds.   Musculoskeletal:         General: Normal range of motion.   Neurological:      Mental Status: She is alert and oriented to person, place, and time.   Psychiatric:         Mood and Affect: Mood normal.         Behavior: Behavior normal.             Assessment and Plan     1. Smoker  -     varenicline (CHANTIX STARTING MONTH BOX) 0.5 mg (11)- 1 mg (42) tablet; Take one 0.5mg tab by mouth once daily X3 days,then increase to one 0.5mg tab twice daily X4 days,then increase to one 1mg tab twice daily  Dispense: 1 each; Refill: 0    2. Hypertension, unspecified type  -     CBC Auto Differential; Future; Expected date: 01/02/2024  -     Comprehensive Metabolic Panel; Future; Expected date: 01/02/2024  -     Lipid Panel; Future; Expected date: 01/02/2024  -     TSH; Future; Expected date: 01/02/2024    3. Elevated liver enzymes  -     Hepatitis Panel, Acute; Future; Expected date: 01/02/2024        Will call pt with lab results and send in buspar if liver enzymes are back in normal range. Pt states the liver enzymes were elevated due to she was previously drinking alcohol.           Follow up if symptoms worsen or fail to improve.

## 2024-01-03 ENCOUNTER — PATIENT MESSAGE (OUTPATIENT)
Dept: PRIMARY CARE CLINIC | Facility: CLINIC | Age: 44
End: 2024-01-03
Payer: MEDICAID

## 2024-01-03 DIAGNOSIS — F41.9 ANXIETY: Primary | ICD-10-CM

## 2024-01-03 RX ORDER — BUSPIRONE HYDROCHLORIDE 10 MG/1
10 TABLET ORAL 2 TIMES DAILY
Qty: 180 TABLET | Refills: 3 | Status: SHIPPED | OUTPATIENT
Start: 2024-01-03

## 2024-05-29 ENCOUNTER — OFFICE VISIT (OUTPATIENT)
Dept: PRIMARY CARE CLINIC | Facility: CLINIC | Age: 44
End: 2024-05-29
Payer: MEDICAID

## 2024-05-29 VITALS
DIASTOLIC BLOOD PRESSURE: 74 MMHG | WEIGHT: 121 LBS | HEIGHT: 64 IN | OXYGEN SATURATION: 98 % | HEART RATE: 97 BPM | SYSTOLIC BLOOD PRESSURE: 112 MMHG | BODY MASS INDEX: 20.66 KG/M2 | TEMPERATURE: 99 F

## 2024-05-29 DIAGNOSIS — R07.0 PAIN IN THROAT: ICD-10-CM

## 2024-05-29 DIAGNOSIS — J01.90 ACUTE NON-RECURRENT SINUSITIS, UNSPECIFIED LOCATION: Primary | ICD-10-CM

## 2024-05-29 DIAGNOSIS — Z13.31 POSITIVE DEPRESSION SCREENING: ICD-10-CM

## 2024-05-29 LAB
CTP QC/QA: YES
MOLECULAR STREP A: NEGATIVE

## 2024-05-29 PROCEDURE — 1160F RVW MEDS BY RX/DR IN RCRD: CPT | Mod: CPTII,,, | Performed by: NURSE PRACTITIONER

## 2024-05-29 PROCEDURE — 3008F BODY MASS INDEX DOCD: CPT | Mod: CPTII,,, | Performed by: NURSE PRACTITIONER

## 2024-05-29 PROCEDURE — 1159F MED LIST DOCD IN RCRD: CPT | Mod: CPTII,,, | Performed by: NURSE PRACTITIONER

## 2024-05-29 PROCEDURE — 3074F SYST BP LT 130 MM HG: CPT | Mod: CPTII,,, | Performed by: NURSE PRACTITIONER

## 2024-05-29 PROCEDURE — 3078F DIAST BP <80 MM HG: CPT | Mod: CPTII,,, | Performed by: NURSE PRACTITIONER

## 2024-05-29 PROCEDURE — 87651 STREP A DNA AMP PROBE: CPT | Mod: RHCUB | Performed by: NURSE PRACTITIONER

## 2024-05-29 PROCEDURE — 99213 OFFICE O/P EST LOW 20 MIN: CPT | Mod: ,,, | Performed by: NURSE PRACTITIONER

## 2024-05-29 RX ORDER — METHYLPREDNISOLONE 4 MG/1
TABLET ORAL
Qty: 21 TABLET | Refills: 0 | Status: SHIPPED | OUTPATIENT
Start: 2024-05-29 | End: 2024-06-19

## 2024-05-29 RX ORDER — PROMETHAZINE HYDROCHLORIDE AND DEXTROMETHORPHAN HYDROBROMIDE 6.25; 15 MG/5ML; MG/5ML
5 SYRUP ORAL EVERY 4 HOURS PRN
Qty: 473 ML | Refills: 0 | Status: SHIPPED | OUTPATIENT
Start: 2024-05-29

## 2024-05-29 RX ORDER — AZITHROMYCIN 250 MG/1
TABLET, FILM COATED ORAL
Qty: 6 TABLET | Refills: 0 | Status: SHIPPED | OUTPATIENT
Start: 2024-05-29

## 2024-05-29 NOTE — PROGRESS NOTES
Subjective     Patient ID: Ericka Vo is a 44 y.o. female.    Chief Complaint: Sore Throat (Pt Complains of sore throat x 4 days.)    Pt presents with sore throat, cough and yellow sinus drainage x 4 days.       Review of Systems   Constitutional:  Negative for activity change, appetite change, chills, fatigue and fever.   HENT:  Positive for nasal congestion, rhinorrhea, sinus pressure/congestion and sore throat. Negative for ear discharge, nosebleeds, postnasal drip, sneezing and tinnitus.    Eyes:  Negative for pain, discharge, redness and itching.   Respiratory:  Positive for cough. Negative for choking, chest tightness, shortness of breath and wheezing.    Cardiovascular:  Negative for chest pain.   Gastrointestinal:  Negative for abdominal distention, abdominal pain, blood in stool, change in bowel habit, constipation, diarrhea, nausea and vomiting.   Genitourinary:  Negative for decreased urine volume, dysuria, flank pain and frequency.   Musculoskeletal:  Negative for back pain and gait problem.   Integumentary:  Negative for wound, breast mass and breast discharge.   Allergic/Immunologic: Negative for immunocompromised state.   Neurological:  Negative for dizziness, light-headedness and headaches.   Psychiatric/Behavioral:  Negative for agitation, behavioral problems and hallucinations.    Breast: Negative for mass         Objective     Physical Exam  Vitals and nursing note reviewed.   Constitutional:       Appearance: Normal appearance.   HENT:      Head: Normocephalic.      Right Ear: Tympanic membrane normal.      Left Ear: Tympanic membrane normal.      Nose: Congestion and rhinorrhea present.      Mouth/Throat:      Mouth: Mucous membranes are moist.      Pharynx: Posterior oropharyngeal erythema present.   Eyes:      Conjunctiva/sclera: Conjunctivae normal.   Cardiovascular:      Rate and Rhythm: Normal rate and regular rhythm.      Heart sounds: Normal heart sounds.   Pulmonary:      Effort:  Pulmonary effort is normal.      Breath sounds: Normal breath sounds.   Musculoskeletal:         General: Normal range of motion.   Neurological:      Mental Status: She is alert and oriented to person, place, and time.   Psychiatric:         Mood and Affect: Mood normal.         Behavior: Behavior normal.            Assessment and Plan     1. Acute non-recurrent sinusitis, unspecified location  -     methylPREDNISolone (MEDROL DOSEPACK) 4 mg tablet; use as directed  Dispense: 21 each; Refill: 0  -     promethazine-dextromethorphan (PROMETHAZINE-DM) 6.25-15 mg/5 mL Syrp; Take 5 mLs by mouth every 4 (four) hours as needed (cough).  Dispense: 473 mL; Refill: 0  -     azithromycin (Z-DEVEN) 250 MG tablet; 2 tablets on day 1 and 1 tablet on days 2-5  Dispense: 6 tablet; Refill: 0    2. Pain in throat  -     POCT Strep A, Molecular    3. Positive depression screening  Comments:  I have reviewed the positive depression score which warrants active treatment with psychotherapy and/or medications.        Rapid strep is negative. Notify clinic if symptoms worsen or persist.          Follow up if symptoms worsen or fail to improve.    I have reviewed the positive depression score which warrants active treatment with psychotherapy and/or medications. Pt states klonopin is the only medication that has helped her in the past with depression. She states she will make an appointment with a mental health provider to discuss.

## 2024-09-10 ENCOUNTER — HOSPITAL ENCOUNTER (INPATIENT)
Facility: HOSPITAL | Age: 44
LOS: 4 days | Discharge: HOME | End: 2024-09-15
Attending: EMERGENCY MEDICINE | Admitting: INTERNAL MEDICINE
Payer: MEDICAID

## 2024-09-10 DIAGNOSIS — S90.512A ABRASION OF LEFT ANKLE, INITIAL ENCOUNTER: ICD-10-CM

## 2024-09-10 DIAGNOSIS — I21.A1 TYPE 2 MI (MYOCARDIAL INFARCTION): ICD-10-CM

## 2024-09-10 DIAGNOSIS — T79.6XXA TRAUMATIC RHABDOMYOLYSIS, INITIAL ENCOUNTER: ICD-10-CM

## 2024-09-10 DIAGNOSIS — S51.019A SKIN TEAR OF ELBOW WITHOUT COMPLICATION, INITIAL ENCOUNTER: ICD-10-CM

## 2024-09-10 DIAGNOSIS — R41.82 AMS (ALTERED MENTAL STATUS): ICD-10-CM

## 2024-09-10 DIAGNOSIS — T79.6XXD TRAUMATIC RHABDOMYOLYSIS, SUBSEQUENT ENCOUNTER: ICD-10-CM

## 2024-09-10 DIAGNOSIS — S80.211A ABRASION, RIGHT KNEE, INITIAL ENCOUNTER: ICD-10-CM

## 2024-09-10 DIAGNOSIS — F41.9 ANXIETY: ICD-10-CM

## 2024-09-10 DIAGNOSIS — M62.82 RHABDOMYOLYSIS: Primary | ICD-10-CM

## 2024-09-10 DIAGNOSIS — S80.212A ABRASION, LEFT KNEE, INITIAL ENCOUNTER: ICD-10-CM

## 2024-09-10 DIAGNOSIS — R07.9 CHEST PAIN: ICD-10-CM

## 2024-09-10 LAB
ALBUMIN SERPL BCP-MCNC: 4 G/DL (ref 3.5–5)
ALBUMIN/GLOB SERPL: 0.9 {RATIO}
ALP SERPL-CCNC: 77 U/L (ref 39–100)
ALT SERPL W P-5'-P-CCNC: 171 U/L (ref 13–56)
AMPHET UR QL SCN: NEGATIVE
ANION GAP SERPL CALCULATED.3IONS-SCNC: 25 MMOL/L (ref 7–16)
APAP SERPL-MCNC: <2 ΜG/ML (ref 10–30)
APTT PPP: <20 SECONDS (ref 25.2–37.3)
AST SERPL W P-5'-P-CCNC: 659 U/L (ref 15–37)
BACTERIA #/AREA URNS HPF: ABNORMAL /HPF
BARBITURATES UR QL SCN: NEGATIVE
BASOPHILS # BLD AUTO: 0.02 K/UL (ref 0–0.2)
BASOPHILS NFR BLD AUTO: 0.2 % (ref 0–1)
BENZODIAZ METAB UR QL SCN: POSITIVE
BILIRUB SERPL-MCNC: 1.4 MG/DL (ref ?–1.2)
BILIRUB UR QL STRIP: NEGATIVE
BUN SERPL-MCNC: 43 MG/DL (ref 7–18)
BUN/CREAT SERPL: 22 (ref 6–20)
CALCIUM SERPL-MCNC: 8 MG/DL (ref 8.5–10.1)
CANNABINOIDS UR QL SCN: NEGATIVE
CHLORIDE SERPL-SCNC: 101 MMOL/L (ref 98–107)
CLARITY UR: CLEAR
CO2 SERPL-SCNC: 16 MMOL/L (ref 21–32)
COCAINE UR QL SCN: NEGATIVE
COLOR UR: ABNORMAL
CREAT SERPL-MCNC: 1.95 MG/DL (ref 0.55–1.02)
DIFFERENTIAL METHOD BLD: ABNORMAL
EGFR (NO RACE VARIABLE) (RUSH/TITUS): 32 ML/MIN/1.73M2
EOSINOPHIL # BLD AUTO: 0.01 K/UL (ref 0–0.5)
EOSINOPHIL NFR BLD AUTO: 0.1 % (ref 1–4)
ERYTHROCYTE [DISTWIDTH] IN BLOOD BY AUTOMATED COUNT: 12.3 % (ref 11.5–14.5)
ETHANOL, BLOOD (CATEGORY): NOT DETECTED
GLOBULIN SER-MCNC: 4.6 G/DL (ref 2–4)
GLUCOSE SERPL-MCNC: 163 MG/DL (ref 74–106)
GLUCOSE UR STRIP-MCNC: NORMAL MG/DL
HCG SERPL-ACNC: <1 MIU/ML
HCO3 UR-SCNC: 15.5 MMOL/L (ref 24–28)
HCT VFR BLD AUTO: 46 % (ref 38–47)
HCT VFR BLD CALC: 48 % (ref 35–51)
HGB BLD-MCNC: 15.5 G/DL (ref 12–16)
HYALINE CASTS #/AREA URNS LPF: ABNORMAL /LPF
IMM GRANULOCYTES # BLD AUTO: 0.05 K/UL (ref 0–0.04)
IMM GRANULOCYTES NFR BLD: 0.6 % (ref 0–0.4)
KETONES UR STRIP-SCNC: 100 MG/DL
LDH SERPL L TO P-CCNC: 4.4 MMOL/L (ref 0.3–1.2)
LEUKOCYTE ESTERASE UR QL STRIP: NEGATIVE
LYMPHOCYTES # BLD AUTO: 0.83 K/UL (ref 1–4.8)
LYMPHOCYTES NFR BLD AUTO: 9.3 % (ref 27–41)
MACROCYTES BLD QL SMEAR: ABNORMAL
MAGNESIUM SERPL-MCNC: 2.3 MG/DL (ref 1.7–2.3)
MCH RBC QN AUTO: 35.6 PG (ref 27–31)
MCHC RBC AUTO-ENTMCNC: 33.7 G/DL (ref 32–36)
MCV RBC AUTO: 105.7 FL (ref 80–96)
MONOCYTES # BLD AUTO: 0.56 K/UL (ref 0–0.8)
MONOCYTES NFR BLD AUTO: 6.3 % (ref 2–6)
MPC BLD CALC-MCNC: 10.5 FL (ref 9.4–12.4)
MUCOUS, UA: ABNORMAL /LPF
MYOGLOBIN SERPL-MCNC: ABNORMAL NG/ML (ref 13–71)
NEUTROPHILS # BLD AUTO: 7.49 K/UL (ref 1.8–7.7)
NEUTROPHILS NFR BLD AUTO: 83.5 % (ref 53–65)
NITRITE UR QL STRIP: NEGATIVE
NRBC # BLD AUTO: 0 X10E3/UL
NRBC, AUTO (.00): 0 %
NT-PROBNP SERPL-MCNC: 2275 PG/ML (ref 1–125)
OPIATES UR QL SCN: NEGATIVE
PCO2 BLDA: 37 MMHG (ref 41–51)
PCP UR QL SCN: NEGATIVE
PH SMN: 7.23 [PH] (ref 7.32–7.42)
PH UR STRIP: 6 PH UNITS
PLATELET # BLD AUTO: 204 K/UL (ref 150–400)
PLATELET MORPHOLOGY: ABNORMAL
PO2 BLDA: 34 MMHG (ref 25–40)
POC BASE EXCESS: -11.3 MMOL/L (ref -2–3)
POC CO2: 16.6 MMOL/L
POC IONIZED CALCIUM: 1.02 MMOL/L (ref 1.15–1.35)
POC SATURATED O2: 52 % (ref 40–70)
POCT GLUCOSE: 186 MG/DL (ref 60–95)
POTASSIUM BLD-SCNC: 4.2 MMOL/L (ref 3.4–4.5)
POTASSIUM SERPL-SCNC: 4.5 MMOL/L (ref 3.5–5.1)
PROT SERPL-MCNC: 8.6 G/DL (ref 6.4–8.2)
PROT UR QL STRIP: 100
RBC # BLD AUTO: 4.35 M/UL (ref 4.2–5.4)
RBC # UR STRIP: ABNORMAL /UL
RBC #/AREA URNS HPF: <1 /HPF
SALICYLATES SERPL-MCNC: <1.7 MG/DL (ref 3–30)
SODIUM BLD-SCNC: 136 MMOL/L (ref 136–145)
SODIUM SERPL-SCNC: 137 MMOL/L (ref 136–145)
SP GR UR STRIP: 1.02
SQUAMOUS #/AREA URNS LPF: ABNORMAL /HPF
TROPONIN I SERPL DL<=0.01 NG/ML-MCNC: 430.7 PG/ML
UROBILINOGEN UR STRIP-ACNC: 2 MG/DL
WBC # BLD AUTO: 8.96 K/UL (ref 4.5–11)
WBC #/AREA URNS HPF: 2 /HPF

## 2024-09-10 PROCEDURE — 84132 ASSAY OF SERUM POTASSIUM: CPT

## 2024-09-10 PROCEDURE — 84484 ASSAY OF TROPONIN QUANT: CPT | Performed by: EMERGENCY MEDICINE

## 2024-09-10 PROCEDURE — 83605 ASSAY OF LACTIC ACID: CPT | Performed by: EMERGENCY MEDICINE

## 2024-09-10 PROCEDURE — 83874 ASSAY OF MYOGLOBIN: CPT | Performed by: EMERGENCY MEDICINE

## 2024-09-10 PROCEDURE — 82947 ASSAY GLUCOSE BLOOD QUANT: CPT

## 2024-09-10 PROCEDURE — 81001 URINALYSIS AUTO W/SCOPE: CPT | Performed by: EMERGENCY MEDICINE

## 2024-09-10 PROCEDURE — 80307 DRUG TEST PRSMV CHEM ANLYZR: CPT | Performed by: EMERGENCY MEDICINE

## 2024-09-10 PROCEDURE — 93010 ELECTROCARDIOGRAM REPORT: CPT | Mod: ,,, | Performed by: INTERNAL MEDICINE

## 2024-09-10 PROCEDURE — 96361 HYDRATE IV INFUSION ADD-ON: CPT

## 2024-09-10 PROCEDURE — 85025 COMPLETE CBC W/AUTO DIFF WBC: CPT | Performed by: EMERGENCY MEDICINE

## 2024-09-10 PROCEDURE — 84702 CHORIONIC GONADOTROPIN TEST: CPT | Performed by: EMERGENCY MEDICINE

## 2024-09-10 PROCEDURE — 63600175 PHARM REV CODE 636 W HCPCS: Performed by: EMERGENCY MEDICINE

## 2024-09-10 PROCEDURE — 96360 HYDRATION IV INFUSION INIT: CPT

## 2024-09-10 PROCEDURE — 93005 ELECTROCARDIOGRAM TRACING: CPT

## 2024-09-10 PROCEDURE — 82077 ASSAY SPEC XCP UR&BREATH IA: CPT | Performed by: EMERGENCY MEDICINE

## 2024-09-10 PROCEDURE — 85730 THROMBOPLASTIN TIME PARTIAL: CPT | Performed by: EMERGENCY MEDICINE

## 2024-09-10 PROCEDURE — 80143 DRUG ASSAY ACETAMINOPHEN: CPT | Performed by: EMERGENCY MEDICINE

## 2024-09-10 PROCEDURE — 83880 ASSAY OF NATRIURETIC PEPTIDE: CPT | Performed by: EMERGENCY MEDICINE

## 2024-09-10 PROCEDURE — 82550 ASSAY OF CK (CPK): CPT | Performed by: EMERGENCY MEDICINE

## 2024-09-10 PROCEDURE — 36415 COLL VENOUS BLD VENIPUNCTURE: CPT | Performed by: EMERGENCY MEDICINE

## 2024-09-10 PROCEDURE — 80179 DRUG ASSAY SALICYLATE: CPT | Performed by: EMERGENCY MEDICINE

## 2024-09-10 PROCEDURE — 83735 ASSAY OF MAGNESIUM: CPT | Performed by: EMERGENCY MEDICINE

## 2024-09-10 PROCEDURE — 83605 ASSAY OF LACTIC ACID: CPT

## 2024-09-10 PROCEDURE — 80053 COMPREHEN METABOLIC PANEL: CPT | Performed by: EMERGENCY MEDICINE

## 2024-09-10 PROCEDURE — 84295 ASSAY OF SERUM SODIUM: CPT

## 2024-09-10 PROCEDURE — 85014 HEMATOCRIT: CPT

## 2024-09-10 PROCEDURE — 99285 EMERGENCY DEPT VISIT HI MDM: CPT | Mod: 25

## 2024-09-10 PROCEDURE — 82330 ASSAY OF CALCIUM: CPT

## 2024-09-10 PROCEDURE — 81003 URINALYSIS AUTO W/O SCOPE: CPT | Performed by: EMERGENCY MEDICINE

## 2024-09-10 PROCEDURE — 82803 BLOOD GASES ANY COMBINATION: CPT

## 2024-09-10 RX ADMIN — SODIUM CHLORIDE, POTASSIUM CHLORIDE, SODIUM LACTATE AND CALCIUM CHLORIDE 1000 ML: 600; 310; 30; 20 INJECTION, SOLUTION INTRAVENOUS at 08:09

## 2024-09-10 RX ADMIN — SODIUM CHLORIDE, POTASSIUM CHLORIDE, SODIUM LACTATE AND CALCIUM CHLORIDE 1000 ML: 600; 310; 30; 20 INJECTION, SOLUTION INTRAVENOUS at 11:09

## 2024-09-11 PROBLEM — R74.01 ELEVATED SGOT (AST): Status: ACTIVE | Noted: 2024-09-11

## 2024-09-11 PROBLEM — G93.40 ACUTE ENCEPHALOPATHY: Status: ACTIVE | Noted: 2024-09-11

## 2024-09-11 PROBLEM — I21.A1 TYPE 2 MI (MYOCARDIAL INFARCTION): Status: ACTIVE | Noted: 2024-09-11

## 2024-09-11 PROBLEM — M62.82 RHABDOMYOLYSIS: Status: ACTIVE | Noted: 2024-09-11

## 2024-09-11 PROBLEM — E87.29 METABOLIC ACIDOSIS, INCREASED ANION GAP: Status: ACTIVE | Noted: 2024-09-11

## 2024-09-11 PROBLEM — N17.9 AKI (ACUTE KIDNEY INJURY): Status: ACTIVE | Noted: 2024-09-11

## 2024-09-11 LAB
ALBUMIN SERPL BCP-MCNC: 2.7 G/DL (ref 3.5–5)
ALBUMIN/GLOB SERPL: 0.8 {RATIO}
ALP SERPL-CCNC: 52 U/L (ref 39–100)
ALT SERPL W P-5'-P-CCNC: 123 U/L (ref 13–56)
AMMONIA PLAS-SCNC: 21 ΜMOL/L (ref 11–32)
ANION GAP SERPL CALCULATED.3IONS-SCNC: 18 MMOL/L (ref 7–16)
AORTIC ROOT ANNULUS: 2.76 CM
AORTIC VALVE CUSP SEPERATION: 2.11 CM
APICAL FOUR CHAMBER EJECTION FRACTION: 77 %
AST SERPL W P-5'-P-CCNC: 429 U/L (ref 15–37)
AV INDEX (PROSTH): 0.62
AV MEAN GRADIENT: 5 MMHG
AV PEAK GRADIENT: 10 MMHG
AV VALVE AREA BY VELOCITY RATIO: 1.39 CM²
AV VALVE AREA: 1.84 CM²
AV VELOCITY RATIO: 0.47
BILIRUB SERPL-MCNC: 0.7 MG/DL (ref ?–1.2)
BSA FOR ECHO PROCEDURE: 1.37 M2
BUN SERPL-MCNC: 27 MG/DL (ref 7–18)
BUN/CREAT SERPL: 29 (ref 6–20)
CALCIUM SERPL-MCNC: 7.3 MG/DL (ref 8.5–10.1)
CHLORIDE SERPL-SCNC: 110 MMOL/L (ref 98–107)
CK SERPL-CCNC: ABNORMAL U/L (ref 26–192)
CO2 SERPL-SCNC: 17 MMOL/L (ref 21–32)
CREAT SERPL-MCNC: 0.92 MG/DL (ref 0.55–1.02)
CV ECHO LV RWT: 0.32 CM
DOP CALC AO PEAK VEL: 1.61 M/S
DOP CALC AO VTI: 24.3 CM
DOP CALC LVOT AREA: 3 CM2
DOP CALC LVOT DIAMETER: 1.95 CM
DOP CALC LVOT PEAK VEL: 0.75 M/S
DOP CALC LVOT STROKE VOLUME: 44.77 CM3
DOP CALCLVOT PEAK VEL VTI: 15 CM
E WAVE DECELERATION TIME: 148.49 MSEC
E/A RATIO: 1.49
E/E' RATIO: 5.45 M/S
ECHO LV POSTERIOR WALL: 0.61 CM (ref 0.6–1.1)
EGFR (NO RACE VARIABLE) (RUSH/TITUS): 78 ML/MIN/1.73M2
FERRITIN SERPL-MCNC: 497 NG/ML (ref 8–252)
FOLATE SERPL-MCNC: 14.1 NG/ML (ref 3.1–17.5)
FRACTIONAL SHORTENING: 31 % (ref 28–44)
GLOBULIN SER-MCNC: 3.2 G/DL (ref 2–4)
GLUCOSE SERPL-MCNC: 113 MG/DL (ref 74–106)
HAV IGM SER QL: NORMAL
HBV CORE IGM SER QL: NORMAL
HBV SURFACE AG SERPL QL IA: NORMAL
HCV AB SER QL: NORMAL
INTERVENTRICULAR SEPTUM: 0.62 CM (ref 0.6–1.1)
IRON SATN MFR SERPL: 15 % (ref 14–50)
IRON SERPL-MCNC: 48 ΜG/DL (ref 50–170)
IVC DIAMETER: 1.41 CM
LACTATE SERPL-SCNC: 1 MMOL/L (ref 0.4–2)
LEFT ATRIUM AREA SYSTOLIC (APICAL 4 CHAMBER): 10.3 CM2
LEFT ATRIUM SIZE: 3.04 CM
LEFT INTERNAL DIMENSION IN SYSTOLE: 2.59 CM (ref 2.1–4)
LEFT VENTRICLE DIASTOLIC VOLUME INDEX: 42.95 ML/M2
LEFT VENTRICLE DIASTOLIC VOLUME: 60.56 ML
LEFT VENTRICLE END DIASTOLIC VOLUME APICAL 4 CHAMBER: 34.52 ML
LEFT VENTRICLE END SYSTOLIC VOLUME APICAL 4 CHAMBER: 19.04 ML
LEFT VENTRICLE MASS INDEX: 42 G/M2
LEFT VENTRICLE SYSTOLIC VOLUME INDEX: 17.2 ML/M2
LEFT VENTRICLE SYSTOLIC VOLUME: 24.27 ML
LEFT VENTRICULAR INTERNAL DIMENSION IN DIASTOLE: 3.76 CM (ref 3.5–6)
LEFT VENTRICULAR MASS: 59.75 G
LV LATERAL E/E' RATIO: 5.27 M/S
LV SEPTAL E/E' RATIO: 5.64 M/S
LVED V (TEICH): 60.56 ML
LVES V (TEICH): 24.27 ML
LVOT MG: 1.22 MMHG
LVOT MV: 0.52 CM/S
MV PEAK A VEL: 0.53 M/S
MV PEAK E VEL: 0.79 M/S
OHS LV EJECTION FRACTION SIMPSONS BIPLANE MOD: 65 %
PISA TR MAX VEL: 2.63 M/S
POTASSIUM SERPL-SCNC: 3.9 MMOL/L (ref 3.5–5.1)
PROT SERPL-MCNC: 5.9 G/DL (ref 6.4–8.2)
PV PEAK GRADIENT: 4 MMHG
PV PEAK VELOCITY: 1.03 M/S
RA MAJOR: 2.62 CM
RA PRESSURE ESTIMATED: 3 MMHG
RIGHT VENTRICLE DIASTOLIC BASEL DIMENSION: 2.4 CM
RIGHT VENTRICLE DIASTOLIC LENGTH: 3.7 CM
RIGHT VENTRICLE DIASTOLIC MID DIMENSION: 2.2 CM
RIGHT VENTRICULAR LENGTH IN DIASTOLE (APICAL 4-CHAMBER VIEW): 3.71 CM
RV MID DIAMA: 2.21 CM
RV TB RVSP: 6 MMHG
SARS-COV-2 RDRP RESP QL NAA+PROBE: NEGATIVE
SODIUM SERPL-SCNC: 141 MMOL/L (ref 136–145)
TDI LATERAL: 0.15 M/S
TDI SEPTAL: 0.14 M/S
TDI: 0.15 M/S
TIBC SERPL-MCNC: 328 ΜG/DL (ref 250–450)
TR MAX PG: 28 MMHG
TRICUSPID ANNULAR PLANE SYSTOLIC EXCURSION: 2.31 CM
TROPONIN I SERPL DL<=0.01 NG/ML-MCNC: 325.9 PG/ML
TROPONIN I SERPL DL<=0.01 NG/ML-MCNC: 422 PG/ML
TV REST PULMONARY ARTERY PRESSURE: 31 MMHG
VIT B12 SERPL-MCNC: 326 PG/ML (ref 193–986)
Z-SCORE OF LEFT VENTRICULAR DIMENSION IN END DIASTOLE: -1.46
Z-SCORE OF LEFT VENTRICULAR DIMENSION IN END SYSTOLE: -0.34

## 2024-09-11 PROCEDURE — 80361 OPIATES 1 OR MORE: CPT | Mod: 90

## 2024-09-11 PROCEDURE — 82607 VITAMIN B-12: CPT | Performed by: INTERNAL MEDICINE

## 2024-09-11 PROCEDURE — 80360 METHYLPHENIDATE: CPT | Mod: 90

## 2024-09-11 PROCEDURE — 82746 ASSAY OF FOLIC ACID SERUM: CPT | Performed by: INTERNAL MEDICINE

## 2024-09-11 PROCEDURE — 94761 N-INVAS EAR/PLS OXIMETRY MLT: CPT

## 2024-09-11 PROCEDURE — 25000003 PHARM REV CODE 250: Performed by: INTERNAL MEDICINE

## 2024-09-11 PROCEDURE — 82728 ASSAY OF FERRITIN: CPT | Performed by: INTERNAL MEDICINE

## 2024-09-11 PROCEDURE — 83550 IRON BINDING TEST: CPT | Performed by: INTERNAL MEDICINE

## 2024-09-11 PROCEDURE — 80367 DRUG SCREENING PROPOXYPHENE: CPT | Mod: 90

## 2024-09-11 PROCEDURE — 93010 ELECTROCARDIOGRAM REPORT: CPT | Mod: ,,, | Performed by: INTERNAL MEDICINE

## 2024-09-11 PROCEDURE — 87635 SARS-COV-2 COVID-19 AMP PRB: CPT | Performed by: EMERGENCY MEDICINE

## 2024-09-11 PROCEDURE — 80373 DRUG SCREENING TRAMADOL: CPT | Mod: 90

## 2024-09-11 PROCEDURE — 93005 ELECTROCARDIOGRAM TRACING: CPT

## 2024-09-11 PROCEDURE — 80365 DRUG SCREENING OXYCODONE: CPT | Mod: 90

## 2024-09-11 PROCEDURE — 80053 COMPREHEN METABOLIC PANEL: CPT | Performed by: INTERNAL MEDICINE

## 2024-09-11 PROCEDURE — 84484 ASSAY OF TROPONIN QUANT: CPT | Performed by: INTERNAL MEDICINE

## 2024-09-11 PROCEDURE — 99291 CRITICAL CARE FIRST HOUR: CPT | Mod: ,,, | Performed by: INTERNAL MEDICINE

## 2024-09-11 PROCEDURE — 96361 HYDRATE IV INFUSION ADD-ON: CPT

## 2024-09-11 PROCEDURE — 99900035 HC TECH TIME PER 15 MIN (STAT)

## 2024-09-11 PROCEDURE — A4217 STERILE WATER/SALINE, 500 ML: HCPCS | Performed by: INTERNAL MEDICINE

## 2024-09-11 PROCEDURE — 63600175 PHARM REV CODE 636 W HCPCS: Performed by: INTERNAL MEDICINE

## 2024-09-11 PROCEDURE — 80346 BENZODIAZEPINES1-12: CPT | Mod: 90

## 2024-09-11 PROCEDURE — 80372 DRUG SCREENING TAPENTADOL: CPT | Mod: 90

## 2024-09-11 PROCEDURE — 80307 DRUG TEST PRSMV CHEM ANLYZR: CPT | Mod: 90 | Performed by: INTERNAL MEDICINE

## 2024-09-11 PROCEDURE — 20000000 HC ICU ROOM

## 2024-09-11 PROCEDURE — 80326 AMPHETAMINES 5 OR MORE: CPT | Mod: 90

## 2024-09-11 PROCEDURE — 80359 METHYLENEDIOXYAMPHETAMINES: CPT | Mod: 90

## 2024-09-11 PROCEDURE — 80356 HEROIN METABOLITE: CPT | Mod: 90

## 2024-09-11 PROCEDURE — 80354 DRUG SCREENING FENTANYL: CPT | Mod: 90

## 2024-09-11 PROCEDURE — 80363 OPIOIDS & OPIATE ANALOGS 3/4: CPT | Mod: 90

## 2024-09-11 PROCEDURE — 80358 DRUG SCREENING METHADONE: CPT | Mod: 90

## 2024-09-11 PROCEDURE — 80368 SEDATIVE HYPNOTICS: CPT | Mod: 90

## 2024-09-11 PROCEDURE — 83540 ASSAY OF IRON: CPT | Performed by: INTERNAL MEDICINE

## 2024-09-11 PROCEDURE — 36415 COLL VENOUS BLD VENIPUNCTURE: CPT | Performed by: INTERNAL MEDICINE

## 2024-09-11 PROCEDURE — 80074 ACUTE HEPATITIS PANEL: CPT | Performed by: INTERNAL MEDICINE

## 2024-09-11 PROCEDURE — 63600175 PHARM REV CODE 636 W HCPCS: Performed by: EMERGENCY MEDICINE

## 2024-09-11 PROCEDURE — 82140 ASSAY OF AMMONIA: CPT | Performed by: INTERNAL MEDICINE

## 2024-09-11 PROCEDURE — 80348 DRUG SCREENING BUPRENORPHINE: CPT | Mod: 90

## 2024-09-11 PROCEDURE — 83992 ASSAY FOR PHENCYCLIDINE: CPT | Mod: 90

## 2024-09-11 PROCEDURE — 99223 1ST HOSP IP/OBS HIGH 75: CPT | Mod: ,,, | Performed by: INTERNAL MEDICINE

## 2024-09-11 RX ORDER — NALOXONE HCL 0.4 MG/ML
0.02 VIAL (ML) INJECTION
Status: DISCONTINUED | OUTPATIENT
Start: 2024-09-11 | End: 2024-09-15 | Stop reason: HOSPADM

## 2024-09-11 RX ORDER — PROCHLORPERAZINE EDISYLATE 5 MG/ML
5 INJECTION INTRAMUSCULAR; INTRAVENOUS EVERY 6 HOURS PRN
Status: DISCONTINUED | OUTPATIENT
Start: 2024-09-11 | End: 2024-09-15 | Stop reason: HOSPADM

## 2024-09-11 RX ORDER — NOREPINEPHRINE BITARTRATE/D5W 4MG/250ML
0-3 PLASTIC BAG, INJECTION (ML) INTRAVENOUS CONTINUOUS
Status: DISCONTINUED | OUTPATIENT
Start: 2024-09-11 | End: 2024-09-11

## 2024-09-11 RX ORDER — HEPARIN SODIUM 5000 [USP'U]/ML
5000 INJECTION, SOLUTION INTRAVENOUS; SUBCUTANEOUS EVERY 8 HOURS
Status: DISCONTINUED | OUTPATIENT
Start: 2024-09-11 | End: 2024-09-11

## 2024-09-11 RX ORDER — NOREPINEPHRINE BITARTRATE/D5W 4MG/250ML
0-3 PLASTIC BAG, INJECTION (ML) INTRAVENOUS CONTINUOUS
Status: DISCONTINUED | OUTPATIENT
Start: 2024-09-11 | End: 2024-09-12

## 2024-09-11 RX ORDER — BUSPIRONE HYDROCHLORIDE 5 MG/1
10 TABLET ORAL 2 TIMES DAILY
Status: DISCONTINUED | OUTPATIENT
Start: 2024-09-11 | End: 2024-09-15 | Stop reason: HOSPADM

## 2024-09-11 RX ORDER — SODIUM CHLORIDE 0.9 % (FLUSH) 0.9 %
10 SYRINGE (ML) INJECTION EVERY 12 HOURS PRN
Status: DISCONTINUED | OUTPATIENT
Start: 2024-09-11 | End: 2024-09-15 | Stop reason: HOSPADM

## 2024-09-11 RX ORDER — ONDANSETRON HYDROCHLORIDE 2 MG/ML
4 INJECTION, SOLUTION INTRAVENOUS EVERY 8 HOURS PRN
Status: DISCONTINUED | OUTPATIENT
Start: 2024-09-11 | End: 2024-09-15 | Stop reason: HOSPADM

## 2024-09-11 RX ORDER — MUPIROCIN 20 MG/G
OINTMENT TOPICAL 2 TIMES DAILY
Status: DISCONTINUED | OUTPATIENT
Start: 2024-09-11 | End: 2024-09-15 | Stop reason: HOSPADM

## 2024-09-11 RX ADMIN — FOLIC ACID 1 MG: 5 INJECTION, SOLUTION INTRAMUSCULAR; INTRAVENOUS; SUBCUTANEOUS at 09:09

## 2024-09-11 RX ADMIN — MUPIROCIN: 20 OINTMENT TOPICAL at 08:09

## 2024-09-11 RX ADMIN — BUSPIRONE HYDROCHLORIDE 10 MG: 5 TABLET ORAL at 08:09

## 2024-09-11 RX ADMIN — NOREPINEPHRINE BITARTRATE 0.42 MCG/KG/MIN: 4 INJECTION, SOLUTION INTRAVENOUS at 01:09

## 2024-09-11 RX ADMIN — ONDANSETRON 4 MG: 2 INJECTION INTRAMUSCULAR; INTRAVENOUS at 08:09

## 2024-09-11 RX ADMIN — SODIUM CHLORIDE, POTASSIUM CHLORIDE, SODIUM LACTATE AND CALCIUM CHLORIDE 1000 ML: 600; 310; 30; 20 INJECTION, SOLUTION INTRAVENOUS at 01:09

## 2024-09-11 RX ADMIN — MUPIROCIN: 20 OINTMENT TOPICAL at 09:09

## 2024-09-11 RX ADMIN — NOREPINEPHRINE BITARTRATE 0.02 MCG/KG/MIN: 4 INJECTION, SOLUTION INTRAVENOUS at 04:09

## 2024-09-11 RX ADMIN — THIAMINE HYDROCHLORIDE 500 MG: 100 INJECTION, SOLUTION INTRAMUSCULAR; INTRAVENOUS at 03:09

## 2024-09-11 RX ADMIN — THIAMINE HYDROCHLORIDE 500 MG: 100 INJECTION, SOLUTION INTRAMUSCULAR; INTRAVENOUS at 08:09

## 2024-09-11 RX ADMIN — SODIUM BICARBONATE: 84 INJECTION, SOLUTION INTRAVENOUS at 06:09

## 2024-09-11 RX ADMIN — HEPARIN SODIUM 5000 UNITS: 5000 INJECTION, SOLUTION INTRAVENOUS; SUBCUTANEOUS at 05:09

## 2024-09-11 RX ADMIN — NOREPINEPHRINE BITARTRATE 0.38 MCG/KG/MIN: 4 INJECTION, SOLUTION INTRAVENOUS at 05:09

## 2024-09-11 RX ADMIN — SODIUM CHLORIDE 1000 ML: 9 INJECTION, SOLUTION INTRAVENOUS at 02:09

## 2024-09-11 RX ADMIN — NOREPINEPHRINE BITARTRATE 0.42 MCG/KG/MIN: 4 INJECTION, SOLUTION INTRAVENOUS at 09:09

## 2024-09-11 RX ADMIN — BUSPIRONE HYDROCHLORIDE 10 MG: 5 TABLET ORAL at 09:09

## 2024-09-11 RX ADMIN — SODIUM BICARBONATE: 84 INJECTION, SOLUTION INTRAVENOUS at 10:09

## 2024-09-11 RX ADMIN — SODIUM CHLORIDE: 4 INJECTION, SOLUTION, CONCENTRATE INTRAVENOUS at 04:09

## 2024-09-11 RX ADMIN — THIAMINE HYDROCHLORIDE 500 MG: 100 INJECTION, SOLUTION INTRAMUSCULAR; INTRAVENOUS at 09:09

## 2024-09-11 RX ADMIN — SODIUM CHLORIDE 2000 ML: 9 INJECTION, SOLUTION INTRAVENOUS at 04:09

## 2024-09-11 RX ADMIN — NOREPINEPHRINE BITARTRATE 0.24 MCG/KG/MIN: 4 INJECTION, SOLUTION INTRAVENOUS at 11:09

## 2024-09-11 RX ADMIN — SODIUM CHLORIDE 250 ML: 9 INJECTION, SOLUTION INTRAVENOUS at 05:09

## 2024-09-11 NOTE — ASSESSMENT & PLAN NOTE
History is not very clear what happened to her shins seen for several days was found wandering room worry that could be related to drugs was positive for benzodiazepines has a history of alcohol use.  Worry about some kind of withdrawal syndrome also alkalinize urine watch kidney function strict I's and O's

## 2024-09-11 NOTE — ASSESSMENT & PLAN NOTE
Patient was found to have myoglobin of 10,500 along with acute renal failure with serum creatinine of 1.95 from a baseline serum creatinine of 0.57.  Will aggressively hydrate this patient and alkalinize his urine as well.

## 2024-09-11 NOTE — ASSESSMENT & PLAN NOTE
REE is likely secondary to dehydration/rhabdomyolysis.  Will start patient on aggressive IV fluid resuscitation and alkalize urine to make urine pH greater than 6.5.  Will monitor urine output and serum creatinine closely

## 2024-09-11 NOTE — ED NOTES
Nacl from EMS hanging and approx 100 mL have gone in. Dr. Carbone notified and stated to give the rest of the fluids from EMS and to give LR 1 L when Nacl is complete.

## 2024-09-11 NOTE — PLAN OF CARE
[facility]  Initial Discharge Assessment       Primary Care Provider: Deysi Simeon FNP    Admission Diagnosis: Rhabdomyolysis [M62.82]  Chest pain [R07.9]  Traumatic rhabdomyolysis, initial encounter [T79.6XXA]  AMS (altered mental status) [R41.82]  Type 2 MI (myocardial infarction) [I21.A1]    Admission Date: 9/10/2024  Expected Discharge Date:     Transition of Care Barriers: None    Payor: MEDICAID [place] / Plan: MEDICAID [facility] [place] [place] / Product Type: Managed Medicaid /     Extended Emergency Contact Information  Primary Emergency Contact: [email or web address]  Mobile Phone: [phone]  Relation: Son  Preferred language: English   needed? No  Secondary Emergency Contact: rey huston  Mobile Phone: [phone]  Relation: Son  Preferred language: English   needed? No  Mother: rony blood  Mobile Phone: [phone]    Discharge Plan A: Home  Discharge Plan B: Home      [facility] - [place], [place] - [address]  [address] [place] [address] [place] [address]  Phone: [phone] Fax: [phone]    [facility] - [place], [place] - [address]  [address]  [place] [address]  Phone: [phone] Fax: [phone]    [facility] - [place], [place] - [address]  [address] [facility] [place] [address]  Phone: [phone] Fax: [phone]    [facility]  [address] [place] [address] [place] [address]  Phone: [phone] Fax: [phone]      Initial Assessment (most recent)       Adult Discharge Assessment - 09/11/24 1100          Discharge Assessment    Assessment Type Discharge Planning Assessment     Confirmed/corrected address, phone number and insurance Yes     Confirmed Demographics Correct on Facesheet     Source of Information patient     Communicated BOB with patient/caregiver Date not available/Unable to determine     People in Home significant other;parent(s)     Do you expect  to return to your current living situation? Yes     Do you have help at home or someone to help you manage your care at home? No     Prior to hospitilization cognitive status: Unable to Assess     Current cognitive status: Alert/Oriented     Walking or Climbing Stairs Difficulty no     Dressing/Bathing Difficulty no     Home Layout Able to live on 1st floor     Equipment Currently Used at Home none     Readmission within 30 days? No     Patient currently being followed by outpatient case management? No     Do you currently have service(s) that help you manage your care at home? No     Do you take prescription medications? No     Do you have prescription coverage? Yes     Do you have any problems affording any of your prescribed medications? No     Who is going to help you get home at discharge? family     How do you get to doctors appointments? family or friend will provide;car, drives self     Are you on dialysis? No     Do you take coumadin? No     Discharge Plan A Home     Discharge Plan B Home     DME Needed Upon Discharge  none     Discharge Plan discussed with: Patient     Transition of Care Barriers None        Physical Activity    On average, how many days per week do you engage in moderate to strenuous exercise (like a brisk walk)? 0 days     On average, how many minutes do you engage in exercise at this level? 0 min        Financial Resource Strain    How hard is it for you to pay for the very basics like food, housing, medical care, and heating? Not hard at all        Housing Stability    In the last 12 months, was there a time when you were not able to pay the mortgage or rent on time? No     At any time in the past 12 months, were you homeless or living in a shelter (including now)? No        Transportation Needs    Has the lack of transportation kept you from medical appointments, meetings, work or from getting things needed for daily living? No        Food Insecurity    Within the past 12 months, you  worried that your food would run out before you got the money to buy more. Never true     Within the past 12 months, the food you bought just didn't last and you didn't have money to get more. Never true        Stress    Do you feel stress - tense, restless, nervous, or anxious, or unable to sleep at night because your mind is troubled all the time - these days? Rather much        Social Isolation    How often do you feel lonely or isolated from those around you?  Never        Utilities    In the past 12 months has the electric, gas, oil, or water company threatened to shut off services in your home? No        Health Literacy    How often do you need to have someone help you when you read instructions, pamphlets, or other written material from your doctor or pharmacy? Never                   Spoke with pt in room. Pt lives home with family, plans to return at [place]. Reviewed chart, 0 dc needs noted. Will follow consults.

## 2024-09-11 NOTE — ASSESSMENT & PLAN NOTE
Feel that patient's acute encephalopathy is stemming from accidental overdose of Xanax and Klonopin coupled with consuming a large amount of alcohol.  Will provide supportive care.  Will start patient on high-intensity thiamine and folic acid level.  Will institute CIWA led protocol once patient shows signs and symptoms of alcohol withdrawal.  Will require frequent neuro check

## 2024-09-11 NOTE — SUBJECTIVE & OBJECTIVE
Past Medical History:   Diagnosis Date    Alcohol abuse        History reviewed. No pertinent surgical history.    Review of patient's allergies indicates:  No Known Allergies    No current facility-administered medications on file prior to encounter.     Current Outpatient Medications on File Prior to Encounter   Medication Sig    azithromycin (Z-DEVEN) 250 MG tablet take 2 tablets by mouth on day one, then 1 tablet once daily    busPIRone (BUSPAR) 10 MG tablet Take 1 tablet (10 mg total) by mouth 2 (two) times daily.    ondansetron (ZOFRAN) 8 MG tablet Take 1 tablet (8 mg total) by mouth 2 (two) times daily.    promethazine-dextromethorphan (PROMETHAZINE-DM) 6.25-15 mg/5 mL Syrp Take 5 mLs by mouth every 4 (four) hours as needed (cough).    valACYclovir (VALTREX) 500 MG tablet Take 500 mg by mouth 2 (two) times daily.    varenicline (CHANTIX STARTING MONTH BOX) 0.5 mg (11)- 1 mg (42) tablet Take one 0.5mg tab by mouth once daily X3 days,then increase to one 0.5mg tab twice daily X4 days,then increase to one 1mg tab twice daily     Family History       Problem Relation (Age of Onset)    Breast cancer Mother, Paternal Grandmother, Paternal Grandfather          Tobacco Use    Smoking status: Every Day     Types: Vaping with nicotine     Passive exposure: Never    Smokeless tobacco: Never   Substance and Sexual Activity    Alcohol use: Yes    Drug use: Yes     Types: Benzodiazepines    Sexual activity: Yes     Partners: Male     Review of Systems   Reason unable to perform ROS: Patient is confused.     Objective:     Vital Signs (Most Recent):  Temp: 97 °F (36.1 °C) (09/11/24 0054)  Pulse: (!) 58 (09/11/24 0054)  Resp: 14 (09/11/24 0054)  BP: (!) 72/45 (09/11/24 0054)  SpO2: 100 % (09/11/24 0054) Vital Signs (24h Range):  Temp:  [97 °F (36.1 °C)] 97 °F (36.1 °C)  Pulse:  [58-71] 58  Resp:  [9-14] 14  SpO2:  [99 %-100 %] 100 %  BP: ()/(41-93) 72/45     Weight: 53.5 kg (118 lb)  Body mass index is 20.25 kg/m².      Physical Exam  Vitals reviewed.   Constitutional:       Appearance: She is ill-appearing.   HENT:      Head: Normocephalic and atraumatic.      Right Ear: External ear normal.      Left Ear: External ear normal.      Mouth/Throat:      Mouth: Mucous membranes are dry.   Eyes:      Extraocular Movements: Extraocular movements intact.      Pupils: Pupils are equal, round, and reactive to light.   Cardiovascular:      Rate and Rhythm: Normal rate and regular rhythm.      Pulses: Normal pulses.      Heart sounds: Normal heart sounds. No murmur heard.  Pulmonary:      Effort: Pulmonary effort is normal. No respiratory distress.      Breath sounds: Normal breath sounds. No wheezing.   Chest:      Chest wall: No tenderness.   Abdominal:      General: Abdomen is flat.      Palpations: Abdomen is soft. There is no mass.      Tenderness: There is no abdominal tenderness. There is no right CVA tenderness or left CVA tenderness.   Musculoskeletal:      Comments: Multiple bruises in the extremities were noted   Skin:     General: Skin is warm and dry.      Capillary Refill: Capillary refill takes less than 2 seconds.   Neurological:      General: No focal deficit present.      Comments: Patient is confused               Significant Labs: All pertinent labs within the past 24 hours have been reviewed.    Significant Imaging: I have reviewed all pertinent imaging results/findings within the past 24 hours.

## 2024-09-11 NOTE — ASSESSMENT & PLAN NOTE
Continue present management with BuSpar.  Feel that patient can benefit from outpatient psychiatry

## 2024-09-11 NOTE — NURSING
0400- spoke with Yady in pharmacy about pts bicarb drip that is due because it is not in unit and did not come with pt from ED. States she will call me back.    0425- bicarb gtt rec'd from pharmacy and started.    0440- pt states she wants to leave if she cannot speak to her son. Explained to pt we do not have phones in room. Offered to call family for her. Pt states she wants her phone and to call her son and mother to retrieve it for her.     0447- attempted to reach mother Pauline and son Bridgett per pt request to bring pts cell phone. No answer. Pt informed.     0619- kel urias returned called. Informed that pt was requesting her cell phone. States they will be up here soon with it.     0650- son bridgett at bedside with pts cell phone

## 2024-09-11 NOTE — HPI
44-year-old female with a history of alcohol and polysubstance abuse who is basically healthy with no other chronic medical issues has a generalized anxiety disorder.  She was found after not being her from since Saturday yesterday on the [facility] want room with multiple bruises.  Patient was difficult to arouse at that time this morning she is awake alert seems confused.  New    Workup in the ER shows a normal CBC but elevated MCV.  Elevated creatinine, metabolic acidosis, abnormal liver function test, CPK 27,000, myoglobin positive, proBNP 2275, troponin 325, positive for benzodiazepines no alcohol or aspirin worsened medicine noted, urinalysis unremarkable arterial blood gases looks like were venous but had lactate 4.4.  CT head looks unremarkable report pending, chest x-ray unremarkable.

## 2024-09-11 NOTE — SUBJECTIVE & OBJECTIVE
Interval History/Significant Events:  Patient is confused this morning    Review of Systems  Objective:     Vital Signs (Most Recent):  Temp: 96.8 °F (36 °C) (09/11/24 0340)  Pulse: 78 (09/11/24 0445)  Resp: 15 (09/11/24 0515)  BP: (!) 89/50 (09/11/24 0515)  SpO2: (!) 88 % (09/11/24 0445) Vital Signs (24h Range):  Temp:  [96.8 °F (36 °C)-97.2 °F (36.2 °C)] 96.8 °F (36 °C)  Pulse:  [58-81] 78  Resp:  [9-19] 15  SpO2:  [88 %-100 %] 88 %  BP: ()/(29-93) 89/50   Weight: 41.8 kg (92 lb 2.4 oz)  Body mass index is 15.82 kg/m².      Intake/Output Summary (Last 24 hours) at 9/11/2024 0619  Last data filed at 9/11/2024 0527  Gross per 24 hour   Intake 3987.18 ml   Output --   Net 3987.18 ml          Physical Exam  Vitals reviewed.   Constitutional:       Appearance: Normal appearance.      Interventions: She is not intubated.  HENT:      Head: Normocephalic and atraumatic.      Nose: Nose normal.      Mouth/Throat:      Mouth: Mucous membranes are dry.      Pharynx: Oropharynx is clear.   Eyes:      Extraocular Movements: Extraocular movements intact.      Conjunctiva/sclera: Conjunctivae normal.      Pupils: Pupils are equal, round, and reactive to light.   Cardiovascular:      Rate and Rhythm: Normal rate.      Heart sounds: Normal heart sounds. No murmur heard.  Pulmonary:      Effort: Pulmonary effort is normal. She is not intubated.      Breath sounds: Normal breath sounds.   Abdominal:      General: Abdomen is flat. Bowel sounds are normal.      Palpations: Abdomen is soft.   Musculoskeletal:         General: Normal range of motion.      Cervical back: Normal range of motion and neck supple.      Right lower leg: No edema.      Left lower leg: No edema.   Skin:     General: Skin is warm and dry.      Capillary Refill: Capillary refill takes less than 2 seconds.   Neurological:      General: No focal deficit present.      Mental Status: She is alert and oriented to person, place, and time.   Psychiatric:          Mood and Affect: Mood normal.         Behavior: Behavior normal.            Vents:     Lines/Drains/Airways       Peripheral Intravenous Line  Duration                  Peripheral IV - Single Lumen 09/10/24 1951 20 G Posterior;Right Hand <1 day         Peripheral IV - Single Lumen 09/11/24 0150 22 G 1 in No Left Antecubital <1 day                  Significant Labs:    CBC/Anemia Profile:  Recent Labs   Lab 09/10/24  2023 09/10/24  2031   WBC  --  8.96   HGB  --  15.5   HCT 48 46.0   PLT  --  204   MCV  --  105.7*   RDW  --  12.3        Chemistries:  Recent Labs   Lab 09/10/24  2031      K 4.5      CO2 16*   BUN 43*   CREATININE 1.95*   CALCIUM 8.0*   ALBUMIN 4.0   PROT 8.6*   BILITOT 1.4*   ALKPHOS 77   *   *   MG 2.3       Recent Lab Results  (Last 5 results in the past 24 hours)        09/11/24  0117   09/10/24  2259   09/10/24  2142   09/10/24  2031   09/10/24  2023        POC CO2         16.6       Benzodiazepines     Positive           Cocaine     Negative           BARBITURATES     Negative           Albumin/Globulin Ratio       0.9         Acetaminophen Level       <2         Albumin       4.0         Alcohol, Serum       Not Detected         ALP       77         ALT       171         Amphetamine, Urine     Negative           Anion Gap       25         Appearance, UA     Clear           PTT       <20.0         AST       659         Bacteria, UA     Few           Baso #       0.02         Basophil %       0.2         Bilirubin (UA)     Negative           BILIRUBIN TOTAL       1.4         BUN       43         BUN/CREAT RATIO       22         Calcium       8.0         Cannabinoid Scrn, Ur     Negative           Chloride       101         CO2       16         Color, UA     Dark Yellow           SARS COV-2 MOLECULAR Negative               CPK       27,032         Creatinine       1.95         Differential Method       Scan Smear         eGFR       32         Eos #       0.01         Eos %        0.1         Globulin, Total       4.6         Glucose       163         Glucose, UA     Normal           Beta HCG Quant       <1  Comment: Non-pregnant females aged 18 years to 62 years: 1 - 3 mIU/mL    Gestational Age          HCG mIU/mL  -------------------            -----------------  0.2 - 1 wk:                    5 - 50 mIU/mL  1 - 2 wks:                     50 - 500 mIU/mL  2 - 3 wks:                     100 - 5,000 mIU/mL  3 - 4 wks:                     500 - 10,000 mIU/mL  4 - 5 wks:                     1,000 - 50,000 mIU/mL  5 - 6 wks:                     10,000 - 100,000 mIU/mL  6 - 8 wks:                     15,000 - 200,000 mIU/mL  2 - 3 months:               10,000 - 100,000 mIU/mL         Hematocrit       46.0         Hemoglobin       15.5         Hyaline Casts, UA     2-5           Immature Grans (Abs)       0.05         Immature Granulocytes       0.6         Ketones, UA     100           Lactic Acid Level   1.0             Leukocyte Esterase, UA     Negative           Lymph #       0.83         Lymph %       9.3         Macrocytosis       Few         Magnesium        2.3         MCH       35.6         MCHC       33.7         MCV       105.7         Mono #       0.56         Mono %       6.3         MPV       10.5         Mucous     Occasional           Myoglobin       10,510         Neutrophils, Abs       7.49         Neutrophils Relative       83.5         NITRITE UA     Negative           nRBC       0.0         NT-proBNP       2,275         NUCLEATED RBC ABSOLUTE       0.00         Blood, UA     Large           Opiates, Urine     Negative           pH, UA     6.0           Phencyclidine, Urine     Negative           PLATELET MORPHOLOGY       Platelet Clumping  Comment: RARE PLATELET CLUMPS AND FIBRIN PRESENT. PLATELET COUNT REPRESENTS THE NUMBER OF FREE PLATELETS IN CIRCULATION.         Platelet Count       204         POC Base Excess         -11.3       POC HCO3         15.5       POC  Hematocrit         48       POC Ionized Calcium         1.02       POC Lactate         4.4       POC PCO2         37       POC PH         7.23       POC PO2         34       POC Potassium         4.2       POC SATURATED O2         52       POC Sodium         136       POCT Glucose         186       Potassium       4.5         PROTEIN TOTAL       8.6         Protein, UA     100           RBC       4.35         RBC, UA     <1           RDW       12.3         Salicylate Level       <1.7         Sodium       137         Spec Grav UA     1.022           Squamous Epithelial Cells, UA     Occasional           Troponin I High Sensitivity   325.9     430.7         UROBILINOGEN UA     2           WBC, UA     2           WBC       8.96                                Significant Imaging:  I have reviewed all pertinent imaging results/findings within the past 24 hours.

## 2024-09-11 NOTE — ASSESSMENT & PLAN NOTE
This is likely secondary to type 2 MI associated with severe rhabdomyolysis.  Do not think that this represents ACS.  Will continue to trend troponin level and proceed with echocardiogram in a.m.

## 2024-09-11 NOTE — HPI
Patient is a 44-year-old female with a history of alcohol and polysubstance abuse but otherwise seemingly healthy with no other known chronic medical issues other than generalized anxiety disorder who was brought in by EMS from her house earlier today after she was found down with multiple bruises on the floor of laundry room.  Last time known normal was last Saturday.  When patient's children arrived at the scene, patient was extremely difficult to arouse and appears significantly confused which ultimately culminated in ED visit.    EN route to the hospital, patient was found to be hypotensive and bradycardic and thus patient was started on IV fluid and given a dose of atropine which she improve both the heart rate and blood pressure    Workup in ED was notable mildly elevated troponin with flat delta troponin level coupled with nonspecific T-wave abnormalities seen on EKG with no previous EKG for comparison, elevated myoglobin level with a myoglobin level of greater than 10,000, serum creatinine of 1.95 from a baseline serum creatinine of 0.57 and UDS positive for presence of benzodiazepine    Patient will be admitted for further evaluation and intervention

## 2024-09-11 NOTE — ASSESSMENT & PLAN NOTE
Patient is a known Klonopin and Ativan abuser.  Once patient's mental status returned to baseline, feel that patient could be discharged with scheduled Klonopin and follow-up with a psychiatrist

## 2024-09-11 NOTE — ASSESSMENT & PLAN NOTE
Uncertain of the cause is this withdrawals is this drug use worry about Wernicke's will give thiamine

## 2024-09-11 NOTE — PROGRESS NOTES
[facility]  Critical Care Medicine  Progress Note    Patient Name: Ericka Vo  MRN: [id number]  Admission Date: 9/10/2024  Hospital Length of Stay: 0 days  Code Status: Full Code  Attending Provider: Martin Reddy MD  Primary Care Provider: Deysi Simeon FNP   Principal Problem: Rhabdomyolysis    Subjective:     HPI:  44-year-old female with a history of alcohol and polysubstance abuse who is basically healthy with no other chronic medical issues has a generalized anxiety disorder.  She was found after not being her from since Saturday yesterday on the isaac want room with multiple bruises.  Patient was difficult to arouse at that time this morning she is awake alert seems confused.  New    Workup in the ER shows a normal CBC but elevated MCV.  Elevated creatinine, metabolic acidosis, abnormal liver function test, CPK 27,000, myoglobin positive, proBNP 2275, troponin 325, positive for benzodiazepines no alcohol or aspirin worsened medicine noted, urinalysis unremarkable arterial blood gases looks like were venous but had lactate 4.4.  CT head looks unremarkable report pending, chest x-ray unremarkable.    Hospital/ICU Course:  No notes on file    Interval History/Significant Events:  Patient is confused this morning    Review of Systems  Objective:     Vital Signs (Most Recent):  Temp: 96.8 °F (36 °C) (09/11/24 0340)  Pulse: 78 (09/11/24 0445)  Resp: 15 (09/11/24 0515)  BP: (!) 89/50 (09/11/24 0515)  SpO2: (!) 88 % (09/11/24 0445) Vital Signs (24h Range):  Temp:  [96.8 °F (36 °C)-97.2 °F (36.2 °C)] 96.8 °F (36 °C)  Pulse:  [58-81] 78  Resp:  [9-19] 15  SpO2:  [88 %-100 %] 88 %  BP: ()/(29-93) 89/50   Weight: 41.8 kg (92 lb 2.4 oz)  Body mass index is 15.82 kg/m².      Intake/Output Summary (Last 24 hours) at 9/11/2024 0619  Last data filed at 9/11/2024 0527  Gross per 24 hour   Intake 3987.18 ml   Output --   Net 3987.18 ml          Physical Exam  Vitals reviewed.    Constitutional:       Appearance: Normal appearance.      Interventions: She is not intubated.  HENT:      Head: Normocephalic and atraumatic.      Nose: Nose normal.      Mouth/Throat:      Mouth: Mucous membranes are dry.      Pharynx: Oropharynx is clear.   Eyes:      Extraocular Movements: Extraocular movements intact.      Conjunctiva/sclera: Conjunctivae normal.      Pupils: Pupils are equal, round, and reactive to light.   Cardiovascular:      Rate and Rhythm: Normal rate.      Heart sounds: Normal heart sounds. No murmur heard.  Pulmonary:      Effort: Pulmonary effort is normal. She is not intubated.      Breath sounds: Normal breath sounds.   Abdominal:      General: Abdomen is flat. Bowel sounds are normal.      Palpations: Abdomen is soft.   Musculoskeletal:         General: Normal range of motion.      Cervical back: Normal range of motion and neck supple.      Right lower leg: No edema.      Left lower leg: No edema.   Skin:     General: Skin is warm and dry.      Capillary Refill: Capillary refill takes less than 2 seconds.   Neurological:      General: No focal deficit present.      Mental Status: She is alert and oriented to person, place, and time.   Psychiatric:         Mood and Affect: Mood normal.         Behavior: Behavior normal.            Vents:     Lines/Drains/Airways       Peripheral Intravenous Line  Duration                  Peripheral IV - Single Lumen 09/10/24 1951 20 G Posterior;Right Hand <1 day         Peripheral IV - Single Lumen 09/11/24 0150 22 G 1 in No Left Antecubital <1 day                  Significant Labs:    CBC/Anemia Profile:  Recent Labs   Lab 09/10/24  2023 09/10/24  2031   WBC  --  8.96   HGB  --  15.5   HCT 48 46.0   PLT  --  204   MCV  --  105.7*   RDW  --  12.3        Chemistries:  Recent Labs   Lab 09/10/24  2031      K 4.5      CO2 16*   BUN 43*   CREATININE 1.95*   CALCIUM 8.0*   ALBUMIN 4.0   PROT 8.6*   BILITOT 1.4*   ALKPHOS 77   *   AST  659*   MG 2.3       Recent Lab Results  (Last 5 results in the past 24 hours)        09/11/24  0117   09/10/24  2259   09/10/24  2142   09/10/24  2031   09/10/24  2023        POC CO2         16.6       Benzodiazepines     Positive           Cocaine     Negative           BARBITURATES     Negative           Albumin/Globulin Ratio       0.9         Acetaminophen Level       <2         Albumin       4.0         Alcohol, Serum       Not Detected         ALP       77         ALT       171         Amphetamine, Urine     Negative           Anion Gap       25         Appearance, UA     Clear           PTT       <20.0         AST       659         Bacteria, UA     Few           Baso #       0.02         Basophil %       0.2         Bilirubin (UA)     Negative           BILIRUBIN TOTAL       1.4         BUN       43         BUN/CREAT RATIO       22         Calcium       8.0         Cannabinoid Scrn, Ur     Negative           Chloride       101         CO2       16         Color, UA     Dark Yellow           SARS COV-2 MOLECULAR Negative               CPK       27,032         Creatinine       1.95         Differential Method       Scan Smear         eGFR       32         Eos #       0.01         Eos %       0.1         Globulin, Total       4.6         Glucose       163         Glucose, UA     Normal           Beta HCG Quant       <1  Comment: Non-pregnant females aged 18 years to 62 years: 1 - 3 mIU/mL    Gestational Age          HCG mIU/mL  -------------------            -----------------  0.2 - 1 wk:                    5 - 50 mIU/mL  1 - 2 wks:                     50 - 500 mIU/mL  2 - 3 wks:                     100 - 5,000 mIU/mL  3 - 4 wks:                     500 - 10,000 mIU/mL  4 - 5 wks:                     1,000 - 50,000 mIU/mL  5 - 6 wks:                     10,000 - 100,000 mIU/mL  6 - 8 wks:                     15,000 - 200,000 mIU/mL  2 - 3 months:               10,000 - 100,000 mIU/mL         Hematocrit        46.0         Hemoglobin       15.5         Hyaline Casts, UA     2-5           Immature Grans (Abs)       0.05         Immature Granulocytes       0.6         Ketones, UA     100           Lactic Acid Level   1.0             Leukocyte Esterase, UA     Negative           Lymph #       0.83         Lymph %       9.3         Macrocytosis       Few         Magnesium        2.3         MCH       35.6         MCHC       33.7         MCV       105.7         Mono #       0.56         Mono %       6.3         MPV       10.5         Mucous     Occasional           Myoglobin       10,510         Neutrophils, Abs       7.49         Neutrophils Relative       83.5         NITRITE UA     Negative           nRBC       0.0         NT-proBNP       2,275         NUCLEATED RBC ABSOLUTE       0.00         Blood, UA     Large           Opiates, Urine     Negative           pH, UA     6.0           Phencyclidine, Urine     Negative           PLATELET MORPHOLOGY       Platelet Clumping  Comment: RARE PLATELET CLUMPS AND FIBRIN PRESENT. PLATELET COUNT REPRESENTS THE NUMBER OF FREE PLATELETS IN CIRCULATION.         Platelet Count       204         POC Base Excess         -11.3       POC HCO3         15.5       POC Hematocrit         48       POC Ionized Calcium         1.02       POC Lactate         4.4       POC PCO2         37       POC PH         7.23       POC PO2         34       POC Potassium         4.2       POC SATURATED O2         52       POC Sodium         136       POCT Glucose         186       Potassium       4.5         PROTEIN TOTAL       8.6         Protein, UA     100           RBC       4.35         RBC, UA     <1           RDW       12.3         Salicylate Level       <1.7         Sodium       137         Spec Grav UA     1.022           Squamous Epithelial Cells, UA     Occasional           Troponin I High Sensitivity   325.9     430.7         UROBILINOGEN UA     2           WBC, UA     2           WBC       8.96                                 Significant Imaging:  I have reviewed all pertinent imaging results/findings within the past 24 hours.    ABG  Recent Labs   Lab 09/10/24  2023   PH 7.23*   PO2 34   PCO2 37*   HCO3 15.5*     Assessment/Plan:     Neuro  Acute encephalopathy  Uncertain of the cause is this withdrawals is this drug use worry about Wernicke's will give thiamine    Cardiac/Vascular  Type 2 MI (myocardial infarction)  EKGs unremarkable repeat troponin probably demand ischemia    Renal/  REE (acute kidney injury)  Lab pending today strict I's and O's possible Morin we can not measure urine output    Metabolic acidosis, increased anion gap  Repeat lactate alkalinize urine    GI  Elevated SGOT (AST)  Elevated liver function tests will need a hepatic viral series and ultrasound    Orthopedic  * Rhabdomyolysis  History is not very clear what happened to her shins seen for several days was found wandering room worry that could be related to drugs was positive for benzodiazepines has a history of alcohol use.  Worry about some kind of withdrawal syndrome also alkalinize urine watch kidney function strict I's and O's    Other  Polysubstance abuse  Noted, drug screen positive for benzodiazepines    Current drinker of alcohol  Ongoing issue    Anxiety  Baseline problem uncertain what she takes for this home [facility]       Critical Care Daily Checklist:    A: Awake: RASS Goal/Actual Goal:    Actual:     B: Spontaneous Breathing Trial Performed?     C: SAT & SBT Coordinated?  Yes                      D: Delirium: CAM-ICU     E: Early Mobility Performed? Yes   F: Feeding Goal:    Status:     Current Diet Order   Procedures    Diet NPO Except for: Medication     Order Specific Question:   Except for:     Answer:   Medication      AS: Analgesia/Sedation None   T: Thromboembolic Prophylaxis Heparin   H: HOB > 300 Yes   U: Stress Ulcer Prophylaxis (if needed) Not indicated   G: Glucose Control Sliding scale   B:  Bowel Function     I: Indwelling Catheter (Lines & Morin) Necessity None   D: De-escalation of Antimicrobials/Pharmacotherapies Not indicated    Plan for the day/ETD Repeat CT head monitor lab    Code Status:  Family/Goals of Care: Full Code  Discuss with family     Critical Care Time:  35 minutes  Critical secondary to Patient has a condition that poses threat to life and bodily function:  Mental status changes of unknown etiology      Critical care was time spent personally by me on the following activities: development of treatment plan with patient or surrogate and bedside caregivers, discussions with consultants, evaluation of patient's response to treatment, examination of patient, ordering and performing treatments and interventions, ordering and review of laboratory studies, ordering and review of radiographic studies, pulse oximetry, re-evaluation of patient's condition. This critical care time did not overlap with that of any other provider or involve time for any procedures.     Martin Reddy MD  Critical Care Medicine  [facility]
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
## 2024-09-11 NOTE — H&P
[facility] - Emergency Department  Hospital Medicine  History & Physical    Patient Name: Ericka Vo  MRN: [id number]  Patient Class: Emergency  Admission Date: 9/10/2024  Attending Physician:  MARISOL Reddy MD  Primary Care Provider: Deysi Simeon FNP         Patient information was obtained from ER records.     Subjective:     Principal Problem:Rhabdomyolysis    Chief Complaint:   Chief Complaint   Patient presents with    Altered Mental Status        HPI: Patient is a 44-year-old female with a history of alcohol and polysubstance abuse but otherwise seemingly healthy with no other known chronic medical issues other than generalized anxiety disorder who was brought in by EMS from her house earlier today after she was found down with multiple bruises on the floor of laundry room.  Last time known normal was last Saturday.  When patient's children arrived at the scene, patient was extremely difficult to arouse and appears significantly confused which ultimately culminated in ED visit.    EN route to the hospital, patient was found to be hypotensive and bradycardic and thus patient was started on IV fluid and given a dose of atropine which she improve both the heart rate and blood pressure    Workup in ED was notable mildly elevated troponin with flat delta troponin level coupled with nonspecific T-wave abnormalities seen on EKG with no previous EKG for comparison, elevated myoglobin level with a myoglobin level of greater than 10,000, serum creatinine of 1.95 from a baseline serum creatinine of 0.57 and UDS positive for presence of benzodiazepine    Patient will be admitted for further evaluation and intervention    Past Medical History:   Diagnosis Date    Alcohol abuse        History reviewed. No pertinent surgical history.    Review of patient's allergies indicates:  No Known Allergies    No current facility-administered medications on file prior to encounter.     Current Outpatient  Medications on File Prior to Encounter   Medication Sig    azithromycin (Z-DEVEN) 250 MG tablet take 2 tablets by mouth on day one, then 1 tablet once daily    busPIRone (BUSPAR) 10 MG tablet Take 1 tablet (10 mg total) by mouth 2 (two) times daily.    ondansetron (ZOFRAN) 8 MG tablet Take 1 tablet (8 mg total) by mouth 2 (two) times daily.    promethazine-dextromethorphan (PROMETHAZINE-DM) 6.25-15 mg/5 mL Syrp Take 5 mLs by mouth every 4 (four) hours as needed (cough).    valACYclovir (VALTREX) 500 MG tablet Take 500 mg by mouth 2 (two) times daily.    varenicline (CHANTIX STARTING MONTH BOX) 0.5 mg (11)- 1 mg (42) tablet Take one 0.5mg tab by mouth once daily X3 days,then increase to one 0.5mg tab twice daily X4 days,then increase to one 1mg tab twice daily     Family History       Problem Relation (Age of Onset)    Breast cancer Mother, Paternal Grandmother, Paternal Grandfather          Tobacco Use    Smoking status: Every Day     Types: Vaping with nicotine     Passive exposure: Never    Smokeless tobacco: Never   Substance and Sexual Activity    Alcohol use: Yes    Drug use: Yes     Types: Benzodiazepines    Sexual activity: Yes     Partners: Male     Review of Systems   Reason unable to perform ROS: Patient is confused.     Objective:     Vital Signs (Most Recent):  Temp: 97 °F (36.1 °C) (09/11/24 0054)  Pulse: (!) 58 (09/11/24 0054)  Resp: 14 (09/11/24 0054)  BP: (!) 72/45 (09/11/24 0054)  SpO2: 100 % (09/11/24 0054) Vital Signs (24h Range):  Temp:  [97 °F (36.1 °C)] 97 °F (36.1 °C)  Pulse:  [58-71] 58  Resp:  [9-14] 14  SpO2:  [99 %-100 %] 100 %  BP: ()/(41-93) 72/45     Weight: 53.5 kg (118 lb)  Body mass index is 20.25 kg/m².     Physical Exam  Vitals reviewed.   Constitutional:       Appearance: She is ill-appearing.   HENT:      Head: Normocephalic and atraumatic.      Right Ear: External ear normal.      Left Ear: External ear normal.      Mouth/Throat:      Mouth: Mucous membranes are dry.   Eyes:       Extraocular Movements: Extraocular movements intact.      Pupils: Pupils are equal, round, and reactive to light.   Cardiovascular:      Rate and Rhythm: Normal rate and regular rhythm.      Pulses: Normal pulses.      Heart sounds: Normal heart sounds. No murmur heard.  Pulmonary:      Effort: Pulmonary effort is normal. No respiratory distress.      Breath sounds: Normal breath sounds. No wheezing.   Chest:      Chest wall: No tenderness.   Abdominal:      General: Abdomen is flat.      Palpations: Abdomen is soft. There is no mass.      Tenderness: There is no abdominal tenderness. There is no right CVA tenderness or left CVA tenderness.   Musculoskeletal:      Comments: Multiple bruises in the extremities were noted   Skin:     General: Skin is warm and dry.      Capillary Refill: Capillary refill takes less than 2 seconds.   Neurological:      General: No focal deficit present.      Comments: Patient is confused               Significant Labs: All pertinent labs within the past 24 hours have been reviewed.    Significant Imaging: I have reviewed all pertinent imaging results/findings within the past 24 hours.  Assessment/Plan:     * Rhabdomyolysis    Patient was found to have myoglobin of 10,500 along with acute renal failure with serum creatinine of 1.95 from a baseline serum creatinine of 0.57.  Will aggressively hydrate this patient and alkalinize his urine as well.          REE (acute kidney injury)    REE is likely secondary to dehydration/rhabdomyolysis.  Will start patient on aggressive IV fluid resuscitation and alkalize urine to make urine pH greater than 6.5.  Will monitor urine output and serum creatinine closely    Metabolic acidosis, increased anion gap    This is secondary to rhabdomyolysis.  Expect this to resolve with IV fluid resuscitation      Acute encephalopathy    Feel that patient's acute encephalopathy is stemming from accidental overdose of Xanax and Klonopin coupled with consuming  a large amount of alcohol.  Will provide supportive care.  Will start patient on high-intensity thiamine and folic acid level.  Will institute CIWA led protocol once patient shows signs and symptoms of alcohol withdrawal.  Patient will require frequent neuro check      Polysubstance abuse    Patient is a known Klonopin and Ativan abuser.  Once patient's mental status returned to baseline, feel that patient could be discharged with scheduled Klonopin and follow-up with a psychiatrist      Current drinker of alcohol    Exact amount of alcohol patient consumes on a regular basis unknown at this time.  However since patient has acute metabolic encephalopathy secondary to Klonopin and Ativan will hold off on starting on CIWA protocol until patient shows some signs of alcohol withdrawal.  High-intensity thiamine and folic acid as stated above      Anxiety    Continue present management with BuSpar.  Feel that patient can benefit from outpatient psychiatry      Type 2 MI          This is likely secondary to rhabdomyolysis.  Do not feel that this         represents ACS.  Will continue to trend troponin level, and proceed         with echocardiogram in a.m.    Elevated AST           Again this is likely secondary to a significantly elevated myoglobin          Level.  Expect this to resolve with resolution of rhabdomyolysis    VTE Risk Mitigation (From admission, onward)      Heparin 5000 units subQ q.8 hours                            Sameer Montes De Oca MD  Department of Hospital Medicine  [facility] - Emergency Department

## 2024-09-11 NOTE — PLAN OF CARE
Problem: Adult Inpatient Plan of Care  Goal: Plan of Care Review  Outcome: Progressing  Goal: Patient-Specific Goal (Individualized)  Outcome: Progressing  Goal: Absence of Hospital-Acquired Illness or Injury  Outcome: Progressing  Goal: Optimal Comfort and Wellbeing  Outcome: Progressing  Goal: Readiness for Transition of Care  Outcome: Progressing     Problem: Acute Kidney Injury/Impairment  Goal: Fluid and Electrolyte Balance  Outcome: Progressing  Intervention: Monitor and Manage Fluid and Electrolyte Balance  Flowsheets (Taken 9/11/2024 1551)  Fluid/Electrolyte Management: fluids restricted  Goal: Improved Oral Intake  Outcome: Progressing  Goal: Effective Renal Function  Outcome: Progressing     Problem: Skin Injury Risk Increased  Goal: Skin Health and Integrity  Outcome: Progressing  Intervention: Optimize Skin Protection  Flowsheets (Taken 9/11/2024 1551)  Pressure Reduction Techniques: frequent weight shift encouraged  Pressure Reduction Devices:   specialty bed utilized   foam padding utilized  Skin Protection: incontinence pads utilized  Activity Management:   Rolling - L1   Heel slide - L1   Arm raise - L1  Head of Bed (HOB) Positioning: HOB at 30-45 degrees     Problem: Wound  Goal: Optimal Coping  Outcome: Progressing  Goal: Optimal Functional Ability  Outcome: Progressing  Goal: Absence of Infection Signs and Symptoms  Outcome: Progressing  Goal: Improved Oral Intake  Outcome: Progressing  Goal: Optimal Pain Control and Function  Outcome: Progressing  Goal: Skin Health and Integrity  Outcome: Progressing  Goal: Optimal Wound Healing  Outcome: Progressing

## 2024-09-11 NOTE — ASSESSMENT & PLAN NOTE
Exact amount of alcohol patient consumes on a regular basis unknown at this time.  However since patient has acute metabolic encephalopathy secondary to Klonopin and Ativan will hold off on starting on CIWA protocol until patient shows some signs of alcohol withdrawal.  High-intensity thiamine and folic acid as stated above

## 2024-09-11 NOTE — PROGRESS NOTES
[facility]  Wound Care    Patient Name:  Ericka Vo   MRN:  [id number]  Date: 9/11/2024  Diagnosis: Rhabdomyolysis    History:     Past Medical History:   Diagnosis Date    Alcohol abuse        Social History     Socioeconomic History    Marital status: Single   Tobacco Use    Smoking status: Every Day     Types: Vaping with nicotine     Passive exposure: Never    Smokeless tobacco: Never   Substance and Sexual Activity    Alcohol use: Yes    Drug use: Yes     Types: Benzodiazepines    Sexual activity: Yes     Partners: Male     Social Determinants of Health     Financial Resource Strain: Low Risk  (9/11/2024)    Overall Financial Resource Strain (CARDIA)     Difficulty of Paying Living Expenses: Not hard at all   Food Insecurity: No Food Insecurity (9/11/2024)    Hunger Vital Sign     Worried About Running Out of Food in the Last Year: Never true     Ran Out of Food in the Last Year: Never true   Transportation Needs: No Transportation Needs (9/11/2024)    TRANSPORTATION NEEDS     Transportation : No   Physical Activity: Inactive (9/11/2024)    Exercise Vital Sign     Days of Exercise per Week: 0 days     Minutes of Exercise per Session: 0 min   Stress: Stress Concern Present (9/11/2024)    [place] [employer] - Occupational Stress Questionnaire     Feeling of Stress : Rather much   Housing Stability: Low Risk  (9/11/2024)    Housing Stability Vital Sign     Unable to Pay for Housing in the Last Year: No     Homeless in the Last Year: No       Precautions:     Allergies as of 09/10/2024    (No Known Allergies)       [facility] Assessment Details/Treatment     Narrative: Seen patient for initiation of preventative skin care measures    Patient in bed, Alert. Has wound care consult for today. Bilateral heels and Sacral with out pressure injury noted. Has bruising and abrasions to knees, elbows and  feet. Has Mepliex foam borders to sacral and Left heel. Right heel with out. On  low air loss mattress  Carlo score 15    Consult skin care for any issues        09/11/2024

## 2024-09-11 NOTE — PLAN OF CARE
Problem: Adult Inpatient Plan of Care  Goal: Plan of Care Review  Outcome: Progressing  Goal: Patient-Specific Goal (Individualized)  Outcome: Progressing  Goal: Absence of Hospital-Acquired Illness or Injury  Outcome: Progressing  Intervention: Identify and Manage Fall Risk  Flowsheets (Taken 9/11/2024 0418)  Safety Promotion/Fall Prevention:   assistive device/personal item within reach   bed alarm set   Fall Risk reviewed with patient/family   Fall Risk signage in place   pulse ox   instructed to call staff for mobility   medications reviewed  Intervention: Prevent Skin Injury  Flowsheets (Taken 9/11/2024 0418)  Body Position: position changed independently  Skin Protection: incontinence pads utilized  Device Skin Pressure Protection: tubing/devices free from skin contact  Intervention: Prevent and Manage VTE (Venous Thromboembolism) Risk  Flowsheets (Taken 9/11/2024 0418)  VTE Prevention/Management: remove, assess skin, and reapply sequential compression device  Intervention: Prevent Infection  Flowsheets (Taken 9/11/2024 0418)  Infection Prevention: hand hygiene promoted  Goal: Optimal Comfort and Wellbeing  Outcome: Progressing  Intervention: Monitor Pain and Promote Comfort  Flowsheets (Taken 9/11/2024 0418)  Pain Management Interventions:   quiet environment facilitated   relaxation techniques promoted  Intervention: Provide Person-Centered Care  Flowsheets (Taken 9/11/2024 0418)  Trust Relationship/Rapport:   care explained   reassurance provided   choices provided   questions answered  Goal: Readiness for Transition of Care  Outcome: Progressing     Problem: Skin Injury Risk Increased  Goal: Skin Health and Integrity  Outcome: Progressing  Intervention: Optimize Skin Protection  Flowsheets (Taken 9/11/2024 0418)  Pressure Reduction Techniques:   frequent weight shift encouraged   weight shift assistance provided  Pressure Reduction Devices: pressure-redistributing mattress utilized  Skin Protection:  incontinence pads utilized  Activity Management:   Arm raise - L1   Rolling - L1  Head of Bed (HOB) Positioning: HOB at 30-45 degrees  Intervention: Promote and Optimize Oral Intake  Flowsheets (Taken 9/11/2024 7198)  Oral Nutrition Promotion:   social interaction promoted   rest periods promoted  Nutrition Interventions: referred to dietitian

## 2024-09-12 LAB
ALBUMIN SERPL BCP-MCNC: 3 G/DL (ref 3.5–5)
ALBUMIN/GLOB SERPL: 1 {RATIO}
ALP SERPL-CCNC: 55 U/L (ref 39–100)
ALT SERPL W P-5'-P-CCNC: 115 U/L (ref 13–56)
ANION GAP SERPL CALCULATED.3IONS-SCNC: 7 MMOL/L (ref 7–16)
AST SERPL W P-5'-P-CCNC: 321 U/L (ref 15–37)
BILIRUB SERPL-MCNC: 0.7 MG/DL (ref ?–1.2)
BUN SERPL-MCNC: 3 MG/DL (ref 7–18)
BUN/CREAT SERPL: 5 (ref 6–20)
CALCIUM SERPL-MCNC: 7.7 MG/DL (ref 8.5–10.1)
CHLORIDE SERPL-SCNC: 101 MMOL/L (ref 98–107)
CK SERPL-CCNC: 8728 U/L (ref 26–192)
CO2 SERPL-SCNC: 37 MMOL/L (ref 21–32)
CREAT SERPL-MCNC: 0.58 MG/DL (ref 0.55–1.02)
EGFR (NO RACE VARIABLE) (RUSH/TITUS): 113 ML/MIN/1.73M2
GLOBULIN SER-MCNC: 3 G/DL (ref 2–4)
GLUCOSE SERPL-MCNC: 138 MG/DL (ref 74–106)
POTASSIUM SERPL-SCNC: 2.7 MMOL/L (ref 3.5–5.1)
PROT SERPL-MCNC: 6 G/DL (ref 6.4–8.2)
SODIUM SERPL-SCNC: 142 MMOL/L (ref 136–145)

## 2024-09-12 PROCEDURE — 94761 N-INVAS EAR/PLS OXIMETRY MLT: CPT

## 2024-09-12 PROCEDURE — 82550 ASSAY OF CK (CPK): CPT | Performed by: INTERNAL MEDICINE

## 2024-09-12 PROCEDURE — 80053 COMPREHEN METABOLIC PANEL: CPT | Performed by: INTERNAL MEDICINE

## 2024-09-12 PROCEDURE — 99233 SBSQ HOSP IP/OBS HIGH 50: CPT | Mod: ,,, | Performed by: INTERNAL MEDICINE

## 2024-09-12 PROCEDURE — 63600175 PHARM REV CODE 636 W HCPCS: Performed by: INTERNAL MEDICINE

## 2024-09-12 PROCEDURE — 25000003 PHARM REV CODE 250: Performed by: INTERNAL MEDICINE

## 2024-09-12 PROCEDURE — 11000001 HC ACUTE MED/SURG PRIVATE ROOM

## 2024-09-12 PROCEDURE — 36415 COLL VENOUS BLD VENIPUNCTURE: CPT | Performed by: INTERNAL MEDICINE

## 2024-09-12 PROCEDURE — 99900035 HC TECH TIME PER 15 MIN (STAT)

## 2024-09-12 RX ORDER — TRAZODONE HYDROCHLORIDE 50 MG/1
50 TABLET ORAL NIGHTLY PRN
Status: DISCONTINUED | OUTPATIENT
Start: 2024-09-12 | End: 2024-09-15 | Stop reason: HOSPADM

## 2024-09-12 RX ORDER — POTASSIUM CHLORIDE 20 MEQ/1
20 TABLET, EXTENDED RELEASE ORAL 2 TIMES DAILY
Status: DISCONTINUED | OUTPATIENT
Start: 2024-09-12 | End: 2024-09-13

## 2024-09-12 RX ORDER — SODIUM CHLORIDE 450 MG/100ML
INJECTION, SOLUTION INTRAVENOUS CONTINUOUS
Status: DISCONTINUED | OUTPATIENT
Start: 2024-09-12 | End: 2024-09-15 | Stop reason: HOSPADM

## 2024-09-12 RX ADMIN — SODIUM CHLORIDE: 450 INJECTION, SOLUTION INTRAVENOUS at 10:09

## 2024-09-12 RX ADMIN — THIAMINE HYDROCHLORIDE 500 MG: 100 INJECTION, SOLUTION INTRAMUSCULAR; INTRAVENOUS at 03:09

## 2024-09-12 RX ADMIN — MUPIROCIN: 20 OINTMENT TOPICAL at 08:09

## 2024-09-12 RX ADMIN — ONDANSETRON 4 MG: 2 INJECTION INTRAMUSCULAR; INTRAVENOUS at 08:09

## 2024-09-12 RX ADMIN — POTASSIUM CHLORIDE 20 MEQ: 1500 TABLET, EXTENDED RELEASE ORAL at 08:09

## 2024-09-12 RX ADMIN — BUSPIRONE HYDROCHLORIDE 10 MG: 5 TABLET ORAL at 08:09

## 2024-09-12 RX ADMIN — SODIUM BICARBONATE: 84 INJECTION, SOLUTION INTRAVENOUS at 03:09

## 2024-09-12 RX ADMIN — TRAZODONE HYDROCHLORIDE 50 MG: 50 TABLET ORAL at 08:09

## 2024-09-12 RX ADMIN — THIAMINE HYDROCHLORIDE 500 MG: 100 INJECTION, SOLUTION INTRAMUSCULAR; INTRAVENOUS at 08:09

## 2024-09-12 RX ADMIN — SODIUM CHLORIDE: 450 INJECTION, SOLUTION INTRAVENOUS at 08:09

## 2024-09-12 RX ADMIN — POTASSIUM CHLORIDE 20 MEQ: 1500 TABLET, EXTENDED RELEASE ORAL at 10:09

## 2024-09-12 RX ADMIN — FOLIC ACID 1 MG: 5 INJECTION, SOLUTION INTRAMUSCULAR; INTRAVENOUS; SUBCUTANEOUS at 08:09

## 2024-09-12 NOTE — NURSING
Pt complaint of new onset r sided chest pain rated 6/10. Pain hurts worse when she breathes in and feels tight. 12 lead EKG obtained and sent via secure chat to dr Montes De Oca

## 2024-09-12 NOTE — PROGRESS NOTES
[facility]  Critical Care Medicine  Progress Note    Patient Name: Ericka Vo  MRN: [id number]  Admission Date: 9/10/2024  Hospital Length of Stay: 1 days  Code Status: Full Code  Attending Provider: Martin Reddy MD  Primary Care Provider: Deysi Simeon FNP   Principal Problem: Rhabdomyolysis    Subjective:     HPI:  44-year-old female with a history of alcohol and polysubstance abuse who is basically healthy with no other chronic medical issues has a generalized anxiety disorder.  She was found after not being her from since Saturday yesterday on the isaac want room with multiple bruises.  Patient was difficult to arouse at that time this morning she is awake alert seems confused.  New    Workup in the ER shows a normal CBC but elevated MCV.  Elevated creatinine, metabolic acidosis, abnormal liver function test, CPK 27,000, myoglobin positive, proBNP 2275, troponin 325, positive for benzodiazepines no alcohol or aspirin worsened medicine noted, urinalysis unremarkable arterial blood gases looks like were venous but had lactate 4.4.  CT head looks unremarkable report pending, chest x-ray unremarkable.    Hospital/ICU Course:  No notes on file    Interval History/Significant Events:  Patient without complaints    Review of Systems  Objective:     Vital Signs (Most Recent):  Temp: 97.7 °F (36.5 °C) (09/12/24 0315)  Pulse: 99 (09/12/24 0600)  Resp: 15 (09/12/24 0600)  BP: 127/81 (09/12/24 0600)  SpO2: 98 % (09/12/24 0600) Vital Signs (24h Range):  Temp:  [97.5 °F (36.4 °C)-98.7 °F (37.1 °C)] 97.7 °F (36.5 °C)  Pulse:  [] 99  Resp:  [8-27] 15  SpO2:  [76 %-100 %] 98 %  BP: ()/() 127/81   Weight: 41.8 kg (92 lb 2.4 oz)  Body mass index is 15.82 kg/m².      Intake/Output Summary (Last 24 hours) at 9/12/2024 0620  Last data filed at 9/12/2024 0558  Gross per 24 hour   Intake 5956.7 ml   Output 3275 ml   Net 2681.7 ml          Physical Exam  Vitals reviewed.    Constitutional:       Appearance: Normal appearance.      Interventions: She is not intubated.  HENT:      Head: Normocephalic and atraumatic.      Nose: Nose normal.      Mouth/Throat:      Mouth: Mucous membranes are dry.      Pharynx: Oropharynx is clear.   Eyes:      Extraocular Movements: Extraocular movements intact.      Conjunctiva/sclera: Conjunctivae normal.      Pupils: Pupils are equal, round, and reactive to light.   Cardiovascular:      Rate and Rhythm: Normal rate.      Heart sounds: Normal heart sounds. No murmur heard.  Pulmonary:      Effort: Pulmonary effort is normal. She is not intubated.      Breath sounds: Normal breath sounds.   Abdominal:      General: Abdomen is flat. Bowel sounds are normal.      Palpations: Abdomen is soft.   Musculoskeletal:         General: Normal range of motion.      Cervical back: Normal range of motion and neck supple.      Right lower leg: No edema.      Left lower leg: No edema.   Skin:     General: Skin is warm and dry.      Capillary Refill: Capillary refill takes less than 2 seconds.   Neurological:      General: No focal deficit present.      Mental Status: She is alert and oriented to person, place, and time.   Psychiatric:         Mood and Affect: Mood normal.         Behavior: Behavior normal.            Vents:  Oxygen Concentration (%): 21 (09/11/24 1905)  Lines/Drains/Airways       Peripheral Intravenous Line  Duration                  Peripheral IV - Single Lumen 09/10/24 1951 20 G Posterior;Right Hand 1 day         Peripheral IV - Single Lumen 09/11/24 0150 22 G 1 in No Left Antecubital 1 day                  Significant Labs:    CBC/Anemia Profile:  Recent Labs   Lab 09/10/24  2023 09/10/24  2031 09/11/24  0658   WBC  --  8.96  --    HGB  --  15.5  --    HCT 48 46.0  --    PLT  --  204  --    MCV  --  105.7*  --    RDW  --  12.3  --    IRON  --   --  48*   FERRITIN  --   --  497*   FOLATE  --   --  14.1   MFOIGAMV10  --   --  326         Chemistries:  Recent Labs   Lab 09/10/24  2031 09/11/24  0544    141   K 4.5 3.9    110*   CO2 16* 17*   BUN 43* 27*   CREATININE 1.95* 0.92   CALCIUM 8.0* 7.3*   ALBUMIN 4.0 2.7*   PROT 8.6* 5.9*   BILITOT 1.4* 0.7   ALKPHOS 77 52   * 123*   * 429*   MG 2.3  --        Recent Lab Results         09/11/24  0948   09/11/24  0658        A4C EF 77         Ammonia   21       Ao root annulus 2.76         Ao peak eduardo 1.61         Ao VTI 24.30         AV valve area 1.84         LA by Velocity Ratio 1.39         AORTIC VALVE CUSP SEPERATION 2.11         AV mean gradient 5         AV index (prosthetic) 0.62         AV peak gradient 10         AV Velocity Ratio 0.47         BSA 1.37         Left Ventricle Relative Wall Thickness 0.32         E/A ratio 1.49         E/E' ratio 5.45         E wave deceleration time 148.49         Ferritin   497       Folate   14.1       FS 31         Hep A IgM   Non-Reactive       Hep B C IgM   Non-Reactive       Hepatitis B Surface Ag   Non-Reactive       Hepatitis C Ab   Non-Reactive       Iron   48       Iron Saturation   15       IVC diameter 1.41         IVSd 0.62         LA area A4C 10.30         LA size 3.04         LVOT area 3.0         LV LATERAL E/E' RATIO 5.27         LV SEPTAL E/E' RATIO 5.64         LV EDV BP 60.56         LV Diastolic Volume Index 42.95         Left Ventricular End Diastolic Volume by Teichholz Method 60.56         LV EDV A4C 34.52         Left Ventricular End Systolic Volume by Teichholz Method 24.27         LV ESV A4C 19.04         LVIDd 3.76         LVIDs 2.59         LV mass 59.75         LV Mass Index 42         Left Ventricular Outflow Tract Mean Gradient 1.22         Left Ventricular Outflow Tract Mean Velocity 0.52         LVOT diameter 1.95         LVOT peak eduardo 0.75         LVOT stroke volume 44.77         LVOT peak VTI 15.00         LV ESV BP 24.27         LV Systolic Volume Index 17.2         Mean e' 0.15         MV Peak A  Bautista 0.53         MV Peak E Bautista 0.79         Kelly's Biplane MOD Ejection Fraction 65         PV peak gradient 4         PV PEAK VELOCITY 1.03         Posterior Wall 0.61         RA Major Axis 2.62         Est. RA pres 3         RV mid diameter 2.21         RV TB RVSP 6         RV- cabello basal diam 2.4         RV-cabello length 3.7         RV-cabello mid d 2.2         Right ventricular length in diastole (apical 4-chamber view) 3.71         TAPSE 2.31         TDI SEPTAL 0.14         TDI LATERAL 0.15         TIBC   328       Triscuspid Valve Regurgitation Peak Gradient 28         TR Max Bautista 2.63         TV resting pulmonary artery pressure 31         Vitamin B12   326       ZLVIDD -1.46         ZLVIDS -0.34                 Significant Imaging:  I have reviewed all pertinent imaging results/findings within the past 24 hours.    ABG  Recent Labs   Lab 09/10/24  2023   PH 7.23*   PO2 34   PCO2 37*   HCO3 15.5*     Assessment/Plan:     Neuro  Acute encephalopathy  Patient can not ascertain what happened to her she thinks she fell.  I do not think this explains the whole issue she complains of neck pain do a CT of the neck    Cardiac/Vascular  Type 2 MI (myocardial infarction)  EKGs unremarkable repeat troponin probably demand ischemia    Renal/  REE (acute kidney injury)  This has resolved  good urine output    Metabolic acidosis, increased anion gap  Lactic acid normal waiting for lab today    GI  Elevated SGOT (AST)  Improving, negative hepatitis profile may have some fatty liver    Orthopedic  * Rhabdomyolysis  Patient receiving fluids will check CPK today improving    Other  Polysubstance abuse  Noted, drug screen positive for benzodiazepines    Current drinker of alcohol  Ongoing issue    Anxiety  Baseline problem uncertain what she takes for this home medicinesare BuSpar        Also hypotension weaning Levophed neck pain CT neck     Martin Reddy MD  Critical Care Medicine  [facility]

## 2024-09-12 NOTE — PLAN OF CARE
Problem: Adult Inpatient Plan of Care  Goal: Plan of Care Review  Outcome: Progressing  Goal: Patient-Specific Goal (Individualized)  Outcome: Progressing  Goal: Absence of Hospital-Acquired Illness or Injury  Outcome: Progressing  Goal: Optimal Comfort and Wellbeing  Outcome: Progressing  Goal: Readiness for Transition of Care  Outcome: Progressing     Problem: Acute Kidney Injury/Impairment  Goal: Fluid and Electrolyte Balance  Outcome: Progressing  Intervention: Monitor and Manage Fluid and Electrolyte Balance  Flowsheets (Taken 9/12/2024 1236)  Fluid/Electrolyte Management: fluids provided  Goal: Improved Oral Intake  Outcome: Progressing  Intervention: Promote and Optimize Oral Intake  Flowsheets (Taken 9/12/2024 1236)  Nutrition Interventions:   food preferences provided   frequent small meals provided  Goal: Effective Renal Function  Outcome: Progressing     Problem: Skin Injury Risk Increased  Goal: Skin Health and Integrity  Outcome: Progressing  Intervention: Optimize Skin Protection  Flowsheets (Taken 9/12/2024 1236)  Pressure Reduction Techniques: frequent weight shift encouraged  Pressure Reduction Devices: specialty bed utilized  Skin Protection: incontinence pads utilized  Activity Management:   Ambulated in room - L4   Sitting at edge of bed - L2   Arm raise - L1   Up to bedside commode - L3  Head of Bed (HOB) Positioning: HOB at 30-45 degrees     Problem: Wound  Goal: Optimal Coping  Outcome: Progressing  Goal: Optimal Functional Ability  Outcome: Progressing  Goal: Absence of Infection Signs and Symptoms  Outcome: Progressing  Goal: Improved Oral Intake  Outcome: Progressing  Goal: Optimal Pain Control and Function  Outcome: Progressing  Goal: Skin Health and Integrity  Outcome: Progressing  Goal: Optimal Wound Healing  Outcome: Progressing

## 2024-09-12 NOTE — SUBJECTIVE & OBJECTIVE
Interval History/Significant Events:  Patient without complaints    Review of Systems  Objective:     Vital Signs (Most Recent):  Temp: 97.7 °F (36.5 °C) (09/12/24 0315)  Pulse: 99 (09/12/24 0600)  Resp: 15 (09/12/24 0600)  BP: 127/81 (09/12/24 0600)  SpO2: 98 % (09/12/24 0600) Vital Signs (24h Range):  Temp:  [97.5 °F (36.4 °C)-98.7 °F (37.1 °C)] 97.7 °F (36.5 °C)  Pulse:  [] 99  Resp:  [8-27] 15  SpO2:  [76 %-100 %] 98 %  BP: ()/() 127/81   Weight: 41.8 kg (92 lb 2.4 oz)  Body mass index is 15.82 kg/m².      Intake/Output Summary (Last 24 hours) at 9/12/2024 0620  Last data filed at 9/12/2024 0558  Gross per 24 hour   Intake 5956.7 ml   Output 3275 ml   Net 2681.7 ml          Physical Exam  Vitals reviewed.   Constitutional:       Appearance: Normal appearance.      Interventions: She is not intubated.  HENT:      Head: Normocephalic and atraumatic.      Nose: Nose normal.      Mouth/Throat:      Mouth: Mucous membranes are dry.      Pharynx: Oropharynx is clear.   Eyes:      Extraocular Movements: Extraocular movements intact.      Conjunctiva/sclera: Conjunctivae normal.      Pupils: Pupils are equal, round, and reactive to light.   Cardiovascular:      Rate and Rhythm: Normal rate.      Heart sounds: Normal heart sounds. No murmur heard.  Pulmonary:      Effort: Pulmonary effort is normal. She is not intubated.      Breath sounds: Normal breath sounds.   Abdominal:      General: Abdomen is flat. Bowel sounds are normal.      Palpations: Abdomen is soft.   Musculoskeletal:         General: Normal range of motion.      Cervical back: Normal range of motion and neck supple.      Right lower leg: No edema.      Left lower leg: No edema.   Skin:     General: Skin is warm and dry.      Capillary Refill: Capillary refill takes less than 2 seconds.   Neurological:      General: No focal deficit present.      Mental Status: She is alert and oriented to person, place, and time.   Psychiatric:          Mood and Affect: Mood normal.         Behavior: Behavior normal.            Vents:  Oxygen Concentration (%): 21 (09/11/24 1905)  Lines/Drains/Airways       Peripheral Intravenous Line  Duration                  Peripheral IV - Single Lumen 09/10/24 1951 20 G Posterior;Right Hand 1 day         Peripheral IV - Single Lumen 09/11/24 0150 22 G 1 in No Left Antecubital 1 day                  Significant Labs:    CBC/Anemia Profile:  Recent Labs   Lab 09/10/24  2023 09/10/24  2031 09/11/24  0658   WBC  --  8.96  --    HGB  --  15.5  --    HCT 48 46.0  --    PLT  --  204  --    MCV  --  105.7*  --    RDW  --  12.3  --    IRON  --   --  48*   FERRITIN  --   --  497*   FOLATE  --   --  14.1   [id number]  --   --  326        Chemistries:  Recent Labs   Lab 09/10/24  2031 09/11/24  0544    141   K 4.5 3.9    110*   CO2 16* 17*   BUN 43* 27*   CREATININE 1.95* 0.92   CALCIUM 8.0* 7.3*   ALBUMIN 4.0 2.7*   PROT 8.6* 5.9*   BILITOT 1.4* 0.7   ALKPHOS 77 52   * 123*   * 429*   MG 2.3  --        Recent Lab Results         09/11/24 0948 09/11/24  0658        A4C EF 77         Ammonia   21       Ao root annulus 2.76         Ao peak eduardo 1.61         Ao VTI 24.30         AV valve area 1.84         LA by Velocity Ratio 1.39         AORTIC VALVE CUSP SEPERATION 2.11         AV mean gradient 5         AV index (prosthetic) 0.62         AV peak gradient 10         AV Velocity Ratio 0.47         BSA 1.37         Left Ventricle Relative Wall Thickness 0.32         E/A ratio 1.49         E/E' ratio 5.45         E wave deceleration time 148.49         Ferritin   497       Folate   14.1       FS 31         Hep A IgM   Non-Reactive       Hep B C IgM   Non-Reactive       Hepatitis B Surface Ag   Non-Reactive       Hepatitis C Ab   Non-Reactive       Iron   48       Iron Saturation   15       IVC diameter 1.41         IVSd 0.62         LA area A4C 10.30         LA size 3.04         LVOT area 3.0         LV LATERAL  E/E' RATIO 5.27         LV SEPTAL E/E' RATIO 5.64         LV EDV BP 60.56         LV Diastolic Volume Index 42.95         Left Ventricular End Diastolic Volume by Teichholz Method 60.56         LV EDV A4C 34.52         Left Ventricular End Systolic Volume by Teichholz Method 24.27         LV ESV A4C 19.04         LVIDd 3.76         LVIDs 2.59         LV mass 59.75         LV Mass Index 42         Left Ventricular Outflow Tract Mean Gradient 1.22         Left Ventricular Outflow Tract Mean Velocity 0.52         LVOT diameter 1.95         LVOT peak bautista 0.75         LVOT stroke volume 44.77         LVOT peak VTI 15.00         LV ESV BP 24.27         LV Systolic Volume Index 17.2         Mean e' 0.15         MV Peak A Bautista 0.53         MV Peak E Bautista 0.79         Kelly's Biplane MOD Ejection Fraction 65         PV peak gradient 4         PV PEAK VELOCITY 1.03         Posterior Wall 0.61         RA Major Axis 2.62         Est. RA pres 3         RV mid diameter 2.21         RV TB RVSP 6         RV- cabello basal diam 2.4         RV-cabello length 3.7         RV-cabello mid d 2.2         Right ventricular length in diastole (apical 4-chamber view) 3.71         TAPSE 2.31         TDI SEPTAL 0.14         TDI LATERAL 0.15         TIBC   328       Triscuspid Valve Regurgitation Peak Gradient 28         TR Max Bautista 2.63         TV resting pulmonary artery pressure 31         Vitamin B12   326       ZLVIDD -1.46         ZLVIDS -0.34                 Significant Imaging:  I have reviewed all pertinent imaging results/findings within the past 24 hours.

## 2024-09-12 NOTE — PROGRESS NOTES
09/11/24 1432        Wound 09/11/24 0345 Skin Tear Left anterior Knee   Date First Assessed/Time First Assessed: 09/11/24 0345   Present on Original Admission: Yes  Primary Wound Type: Skin Tear  Side: Left  Orientation: anterior  Location: Knee   Wound Image Images linked   Dressing Appearance Open to air   Drainage Amount None   Drainage Characteristics/Odor Clear   Appearance Pink;Intact;Maroon  (Bruising )   Tissue loss description Partial thickness   Black (%), Wound Tissue Color 10 %   Red (%), Wound Tissue Color 90 %   Yellow (%), Wound Tissue Color 0 %   Periwound Area Blistered   Wound Edges Irregular;Undefined   Wound Length (cm) 2 cm   Wound Width (cm) 3 cm   Wound Depth (cm) 0 cm   Wound Volume (cm^3) 0 cm^3   Wound Surface Area (cm^2) 6 cm^2   Care Antimicrobial agent   Dressing Applied;Silver;Foam;Island/border   Periwound Care Moisture barrier applied   Dressing Change Due 09/16/24      Multiple abrasion and skin tears noted to BLE, elbows, knees.   Applied Aquacel Ag and foam borders, change every 5 days and prn

## 2024-09-12 NOTE — PLAN OF CARE
Problem: Adult Inpatient Plan of Care  Goal: Plan of Care Review  Outcome: Progressing  Goal: Patient-Specific Goal (Individualized)  Outcome: Progressing  Goal: Absence of Hospital-Acquired Illness or Injury  Outcome: Progressing  Goal: Optimal Comfort and Wellbeing  Outcome: Progressing  Goal: Readiness for Transition of Care  Outcome: Progressing     Problem: Acute Kidney Injury/Impairment  Goal: Fluid and Electrolyte Balance  Outcome: Progressing  Goal: Improved Oral Intake  Outcome: Progressing  Goal: Effective Renal Function  Outcome: Progressing     Problem: Skin Injury Risk Increased  Goal: Skin Health and Integrity  Outcome: Progressing     Problem: Wound  Goal: Optimal Coping  Outcome: Progressing  Goal: Optimal Functional Ability  Outcome: Progressing  Goal: Absence of Infection Signs and Symptoms  Outcome: Progressing  Goal: Improved Oral Intake  Outcome: Progressing  Goal: Optimal Pain Control and Function  Outcome: Progressing  Goal: Skin Health and Integrity  Outcome: Progressing  Goal: Optimal Wound Healing  Outcome: Progressing

## 2024-09-12 NOTE — ASSESSMENT & PLAN NOTE
Patient can not ascertain what happened to her she thinks she fell.  I do not think this explains the whole issue she complains of neck pain do a CT of the neck

## 2024-09-12 NOTE — PROGRESS NOTES
09/12/24 1218   Wound Care Follow Up   Wound Care Follow-up? Yes   Wound Care- Next Visit Date 09/16/24   Follow Up Plan Anthony POC

## 2024-09-12 NOTE — ASSESSMENT & PLAN NOTE
Lactic acid normal waiting for lab today   We discussed sebaceous cyst right suboccipital / posterior neck  With small size and minimal inflammation today I would not do incision and drainage, just observe, if this would enlarge with fluctuance we can do I and D, she also does see Dermatology on occasion, can discuss this with them     She uses Xanax very sparingly , 1/2 pill every few weeks, I did refill 20 pills for her, previously received 20 pills in September       Also - continue to see Neurology as is regarding headaches

## 2024-09-13 LAB
ALBUMIN SERPL BCP-MCNC: 3 G/DL (ref 3.5–5)
ALBUMIN/GLOB SERPL: 0.9 {RATIO}
ALP SERPL-CCNC: 57 U/L (ref 39–100)
ALT SERPL W P-5'-P-CCNC: 110 U/L (ref 13–56)
ANION GAP SERPL CALCULATED.3IONS-SCNC: 6 MMOL/L (ref 7–16)
AST SERPL W P-5'-P-CCNC: 246 U/L (ref 15–37)
BILIRUB SERPL-MCNC: 0.9 MG/DL (ref ?–1.2)
BUN SERPL-MCNC: 2 MG/DL (ref 7–18)
BUN/CREAT SERPL: 4 (ref 6–20)
CALCIUM SERPL-MCNC: 8 MG/DL (ref 8.5–10.1)
CHLORIDE SERPL-SCNC: 103 MMOL/L (ref 98–107)
CK SERPL-CCNC: 4348 U/L (ref 26–192)
CO2 SERPL-SCNC: 34 MMOL/L (ref 21–32)
CREAT SERPL-MCNC: 0.47 MG/DL (ref 0.55–1.02)
EGFR (NO RACE VARIABLE) (RUSH/TITUS): 119 ML/MIN/1.73M2
GLOBULIN SER-MCNC: 3.3 G/DL (ref 2–4)
GLUCOSE SERPL-MCNC: 91 MG/DL (ref 74–106)
MAGNESIUM SERPL-MCNC: 1.7 MG/DL (ref 1.7–2.3)
OHS QRS DURATION: 70 MS
OHS QRS DURATION: 76 MS
OHS QTC CALCULATION: 455 MS
OHS QTC CALCULATION: 534 MS
POTASSIUM SERPL-SCNC: 2.5 MMOL/L (ref 3.5–5.1)
PROT SERPL-MCNC: 6.3 G/DL (ref 6.4–8.2)
SODIUM SERPL-SCNC: 140 MMOL/L (ref 136–145)

## 2024-09-13 PROCEDURE — 83735 ASSAY OF MAGNESIUM: CPT | Performed by: INTERNAL MEDICINE

## 2024-09-13 PROCEDURE — 80053 COMPREHEN METABOLIC PANEL: CPT | Performed by: INTERNAL MEDICINE

## 2024-09-13 PROCEDURE — 63600175 PHARM REV CODE 636 W HCPCS: Performed by: INTERNAL MEDICINE

## 2024-09-13 PROCEDURE — 25000003 PHARM REV CODE 250: Performed by: INTERNAL MEDICINE

## 2024-09-13 PROCEDURE — 36415 COLL VENOUS BLD VENIPUNCTURE: CPT | Performed by: INTERNAL MEDICINE

## 2024-09-13 PROCEDURE — 82550 ASSAY OF CK (CPK): CPT | Performed by: INTERNAL MEDICINE

## 2024-09-13 PROCEDURE — 11000001 HC ACUTE MED/SURG PRIVATE ROOM

## 2024-09-13 PROCEDURE — 93005 ELECTROCARDIOGRAM TRACING: CPT

## 2024-09-13 PROCEDURE — 93010 ELECTROCARDIOGRAM REPORT: CPT | Mod: ,,, | Performed by: INTERNAL MEDICINE

## 2024-09-13 PROCEDURE — 99233 SBSQ HOSP IP/OBS HIGH 50: CPT | Mod: ,,, | Performed by: INTERNAL MEDICINE

## 2024-09-13 RX ORDER — POTASSIUM CHLORIDE 750 MG/1
30 TABLET, EXTENDED RELEASE ORAL 3 TIMES DAILY
Status: DISCONTINUED | OUTPATIENT
Start: 2024-09-13 | End: 2024-09-15 | Stop reason: HOSPADM

## 2024-09-13 RX ADMIN — SODIUM CHLORIDE: 450 INJECTION, SOLUTION INTRAVENOUS at 07:09

## 2024-09-13 RX ADMIN — SODIUM CHLORIDE: 450 INJECTION, SOLUTION INTRAVENOUS at 08:09

## 2024-09-13 RX ADMIN — FOLIC ACID 1 MG: 5 INJECTION, SOLUTION INTRAMUSCULAR; INTRAVENOUS; SUBCUTANEOUS at 09:09

## 2024-09-13 RX ADMIN — POTASSIUM CHLORIDE 30 MEQ: 750 TABLET, EXTENDED RELEASE ORAL at 02:09

## 2024-09-13 RX ADMIN — POTASSIUM CHLORIDE 30 MEQ: 750 TABLET, EXTENDED RELEASE ORAL at 09:09

## 2024-09-13 RX ADMIN — TRAZODONE HYDROCHLORIDE 50 MG: 50 TABLET ORAL at 08:09

## 2024-09-13 RX ADMIN — MUPIROCIN: 20 OINTMENT TOPICAL at 08:09

## 2024-09-13 RX ADMIN — BUSPIRONE HYDROCHLORIDE 10 MG: 5 TABLET ORAL at 09:09

## 2024-09-13 RX ADMIN — BUSPIRONE HYDROCHLORIDE 10 MG: 5 TABLET ORAL at 08:09

## 2024-09-13 RX ADMIN — POTASSIUM CHLORIDE 30 MEQ: 750 TABLET, EXTENDED RELEASE ORAL at 08:09

## 2024-09-13 RX ADMIN — THIAMINE HYDROCHLORIDE 500 MG: 100 INJECTION, SOLUTION INTRAMUSCULAR; INTRAVENOUS at 09:09

## 2024-09-13 RX ADMIN — MUPIROCIN: 20 OINTMENT TOPICAL at 09:09

## 2024-09-13 RX ADMIN — THIAMINE HYDROCHLORIDE 500 MG: 100 INJECTION, SOLUTION INTRAMUSCULAR; INTRAVENOUS at 03:09

## 2024-09-13 NOTE — PROGRESS NOTES
[facility]  Critical Care Medicine  Progress Note    Patient Name: Ericka Vo  MRN: [id number]  Admission Date: 9/10/2024  Hospital Length of Stay: 2 days  Code Status: Full Code  Attending Provider: Martin Reddy MD  Primary Care Provider: Deysi Simeon FNP   Principal Problem: Rhabdomyolysis    Subjective:     HPI:  44-year-old female with a history of alcohol and polysubstance abuse who is basically healthy with no other chronic medical issues has a generalized anxiety disorder.  She was found after not being her from since Saturday yesterday on the [facility] want room with multiple bruises.  Patient was difficult to arouse at that time this morning she is awake alert seems confused.  New    Workup in the ER shows a normal CBC but elevated MCV.  Elevated creatinine, metabolic acidosis, abnormal liver function test, CPK 27,000, myoglobin positive, proBNP 2275, troponin 325, positive for benzodiazepines no alcohol or aspirin worsened medicine noted, urinalysis unremarkable arterial blood gases looks like were venous but had lactate 4.4.  CT head looks unremarkable report pending, chest x-ray unremarkable.    Hospital/ICU Course:  No notes on file    No new subjective & objective note has been filed under this hospital service since the last note was generated.      ABG  Recent Labs   Lab 09/10/24  2023   PH 7.23*   PO2 34   PCO2 37*   HCO3 15.5*     Assessment/Plan:     Neuro  Acute encephalopathy  Encephalopathy has resolved we did a CT of her neck because of some pain there is no evidence of any fractures some spasm otherwise no abnormalities some degenerative change    Cardiac/Vascular  Type 2 MI (myocardial infarction)  EKGs unremarkable repeat troponin probably demand ischemia    Renal/  REE (acute kidney injury)  Resolved is ready for transfer to the floor    Metabolic acidosis, increased anion gap  Resolved    GI  Elevated SGOT (AST)  Improving, negative hepatitis  profile may have some fatty liver    Orthopedic  * Rhabdomyolysis  CPKs down to 4000 but needs continued fluids    Other  Polysubstance abuse  Noted, drug screen positive for benzodiazepines    Current drinker of alcohol  Ongoing issue    Anxiety  Baseline problem uncertain what she takes for this home medicinesare Abdi Reddy MD  Critical Care Medicine  [facility]

## 2024-09-13 NOTE — HOSPITAL COURSE
Problem list 1 patient was admitted with encephalopathy of unknown etiology it is resolved she was apparently down for couple of days is known to take alcoholic benzodiazepines 2.  Rhabdomyolysis needs fluids another day CPKs down to 4000 3.  Elevated liver function tests trending down 4. Hypokalemia aggressively supplementing  Ready for transfer to the floor

## 2024-09-13 NOTE — PROGRESS NOTES
[facility]  Critical Care Medicine  Progress Note    Patient Name: Ericka Vo  MRN: [id number]  Admission Date: 9/10/2024  Hospital Length of Stay: 2 days  Code Status: Full Code  Attending Provider: Martin Reddy MD  Primary Care Provider: Deysi Simeon FNP   Principal Problem: Rhabdomyolysis    Subjective:     HPI:  44-year-old female with a history of alcohol and polysubstance abuse who is basically healthy with no other chronic medical issues has a generalized anxiety disorder.  She was found after not being her from since Saturday yesterday on the [facility] want room with multiple bruises.  Patient was difficult to arouse at that time this morning she is awake alert seems confused.  New    Workup in the ER shows a normal CBC but elevated MCV.  Elevated creatinine, metabolic acidosis, abnormal liver function test, CPK 27,000, myoglobin positive, proBNP 2275, troponin 325, positive for benzodiazepines no alcohol or aspirin worsened medicine noted, urinalysis unremarkable arterial blood gases looks like were venous but had lactate 4.4.  CT head looks unremarkable report pending, chest x-ray unremarkable.    Hospital/ICU Course:  No notes on file    Interval History/Significant Events:  Patient without complaints    Review of Systems  Objective:     Vital Signs (Most Recent):  Temp: 98.3 °F (36.8 °C) (09/13/24 0315)  Pulse: 90 (09/13/24 0630)  Resp: 12 (09/13/24 0630)  BP: 122/77 (09/13/24 0600)  SpO2: 97 % (09/13/24 0630) Vital Signs (24h Range):  Temp:  [98.1 °F (36.7 °C)-99 °F (37.2 °C)] 98.3 °F (36.8 °C)  Pulse:  [] 90  Resp:  [8-23] 12  SpO2:  [92 %-100 %] 97 %  BP: (105-154)/(70-97) 122/77   Weight: 41.8 kg (92 lb 2.4 oz)  Body mass index is 15.82 kg/m².      Intake/Output Summary (Last 24 hours) at 9/13/2024 0730  Last data filed at 9/13/2024 0638  Gross per 24 hour   Intake 2742.74 ml   Output 2450 ml   Net 292.74 ml          Physical Exam  Vitals reviewed.  "  Constitutional:       Appearance: Normal appearance.      Interventions: She is not intubated.  HENT:      Head: Normocephalic and atraumatic.      Nose: Nose normal.      Mouth/Throat:      Mouth: Mucous membranes are dry.      Pharynx: Oropharynx is clear.   Eyes:      Extraocular Movements: Extraocular movements intact.      Conjunctiva/sclera: Conjunctivae normal.      Pupils: Pupils are equal, round, and reactive to light.   Cardiovascular:      Rate and Rhythm: Normal rate.      Heart sounds: Normal heart sounds. No murmur heard.  Pulmonary:      Effort: Pulmonary effort is normal. She is not intubated.      Breath sounds: Normal breath sounds.   Abdominal:      General: Abdomen is flat. Bowel sounds are normal.      Palpations: Abdomen is soft.   Musculoskeletal:         General: Normal range of motion.      Cervical back: Normal range of motion and neck supple.      Right lower leg: No edema.      Left lower leg: No edema.   Skin:     General: Skin is warm and dry.      Capillary Refill: Capillary refill takes less than 2 seconds.   Neurological:      General: No focal deficit present.      Mental Status: She is alert and oriented to person, place, and time.   Psychiatric:         Mood and Affect: Mood normal.         Behavior: Behavior normal.            Vents:  Oxygen Concentration (%): 21 (09/12/24 1920)  Lines/Drains/Airways       Peripheral Intravenous Line  Duration                  Peripheral IV - Single Lumen 09/11/24 0150 22 G 1 in No Left Antecubital 2 days         Peripheral IV - Single Lumen 09/12/24 1032 20 G No Anterior;Right Forearm <1 day                  Significant Labs:    CBC/Anemia Profile:  No results for input(s): "WBC", "HGB", "HCT", "PLT", "MCV", "RDW", "IRON", "FERRITIN", "RETIC", "FOLATE", "[id number]", "OCCULTBLOOD" in the last 48 hours.     Chemistries:  Recent Labs   Lab 09/12/24  0452 09/13/24  0515    140   K 2.7* 2.5*    103   CO2 37* 34*   BUN 3* 2* "   CREATININE 0.58 0.47*   CALCIUM 7.7* 8.0*   ALBUMIN 3.0* 3.0*   PROT 6.0* 6.3*   BILITOT 0.7 0.9   ALKPHOS 55 57   * 110*   * 246*       Recent Lab Results         09/13/24  0515        Albumin/Globulin Ratio 0.9       Albumin 3.0       ALP 57              Anion Gap 6              BILIRUBIN TOTAL 0.9       BUN 2       BUN/CREAT RATIO 4       Calcium 8.0       Chloride 103       CO2 34       CPK 4,348       Creatinine 0.47       eGFR 119       Globulin, Total 3.3       Glucose 91       Potassium 2.5       PROTEIN TOTAL 6.3       Sodium 140               Significant Imaging:  I have reviewed all pertinent imaging results/findings within the past 24 hours.    ABG  Recent Labs   Lab 09/10/24  2023   PH 7.23*   PO2 34   PCO2 37*   HCO3 15.5*     Assessment/Plan:     Neuro  Acute encephalopathy  Encephalopathy has resolved we did a CT of her neck because of some pain there is no evidence of any fractures some spasm otherwise no abnormalities some degenerative change    Cardiac/Vascular  Type 2 MI (myocardial infarction)  EKGs unremarkable repeat troponin probably demand ischemia    Renal/  REE (acute kidney injury)  Resolved is ready for transfer to the floor    Metabolic acidosis, increased anion gap  Resolved    GI  Elevated SGOT (AST)  Improving, negative hepatitis profile may have some fatty liver    Orthopedic  * Rhabdomyolysis  CPKs down to 4000 but needs continued fluids    Other  Polysubstance abuse  Noted, drug screen positive for benzodiazepines    Current drinker of alcohol  Ongoing issue    Anxiety  Baseline problem uncertain what she takes for this home medicinesare BuSpar     patient not ready for discharge okay for transfer to the floor         Martin Reddy MD  Critical Care Medicine  [facility]

## 2024-09-13 NOTE — SUBJECTIVE & OBJECTIVE
Interval History/Significant Events:  Patient without complaints    Review of Systems  Objective:     Vital Signs (Most Recent):  Temp: 98.3 °F (36.8 °C) (09/13/24 0315)  Pulse: 90 (09/13/24 0630)  Resp: 12 (09/13/24 0630)  BP: 122/77 (09/13/24 0600)  SpO2: 97 % (09/13/24 0630) Vital Signs (24h Range):  Temp:  [98.1 °F (36.7 °C)-99 °F (37.2 °C)] 98.3 °F (36.8 °C)  Pulse:  [] 90  Resp:  [8-23] 12  SpO2:  [92 %-100 %] 97 %  BP: (105-154)/(70-97) 122/77   Weight: 41.8 kg (92 lb 2.4 oz)  Body mass index is 15.82 kg/m².      Intake/Output Summary (Last 24 hours) at 9/13/2024 0730  Last data filed at 9/13/2024 0638  Gross per 24 hour   Intake 2742.74 ml   Output 2450 ml   Net 292.74 ml          Physical Exam  Vitals reviewed.   Constitutional:       Appearance: Normal appearance.      Interventions: She is not intubated.  HENT:      Head: Normocephalic and atraumatic.      Nose: Nose normal.      Mouth/Throat:      Mouth: Mucous membranes are dry.      Pharynx: Oropharynx is clear.   Eyes:      Extraocular Movements: Extraocular movements intact.      Conjunctiva/sclera: Conjunctivae normal.      Pupils: Pupils are equal, round, and reactive to light.   Cardiovascular:      Rate and Rhythm: Normal rate.      Heart sounds: Normal heart sounds. No murmur heard.  Pulmonary:      Effort: Pulmonary effort is normal. She is not intubated.      Breath sounds: Normal breath sounds.   Abdominal:      General: Abdomen is flat. Bowel sounds are normal.      Palpations: Abdomen is soft.   Musculoskeletal:         General: Normal range of motion.      Cervical back: Normal range of motion and neck supple.      Right lower leg: No edema.      Left lower leg: No edema.   Skin:     General: Skin is warm and dry.      Capillary Refill: Capillary refill takes less than 2 seconds.   Neurological:      General: No focal deficit present.      Mental Status: She is alert and oriented to person, place, and time.   Psychiatric:          "Mood and Affect: Mood normal.         Behavior: Behavior normal.            Vents:  Oxygen Concentration (%): 21 (09/12/24 1920)  Lines/Drains/Airways       Peripheral Intravenous Line  Duration                  Peripheral IV - Single Lumen 09/11/24 0150 22 G 1 in No Left Antecubital 2 days         Peripheral IV - Single Lumen 09/12/24 1032 20 G No Anterior;Right Forearm <1 day                  Significant Labs:    CBC/Anemia Profile:  No results for input(s): "WBC", "HGB", "HCT", "PLT", "MCV", "RDW", "IRON", "FERRITIN", "RETIC", "FOLATE", "[id number]", "OCCULTBLOOD" in the last 48 hours.     Chemistries:  Recent Labs   Lab 09/12/24  0452 09/13/24  0515    140   K 2.7* 2.5*    103   CO2 37* 34*   BUN 3* 2*   CREATININE 0.58 0.47*   CALCIUM 7.7* 8.0*   ALBUMIN 3.0* 3.0*   PROT 6.0* 6.3*   BILITOT 0.7 0.9   ALKPHOS 55 57   * 110*   * 246*       Recent Lab Results         09/13/24  0515        Albumin/Globulin Ratio 0.9       Albumin 3.0       ALP 57              Anion Gap 6              BILIRUBIN TOTAL 0.9       BUN 2       BUN/CREAT RATIO 4       Calcium 8.0       Chloride 103       CO2 34       CPK 4,348       Creatinine 0.47       eGFR 119       Globulin, Total 3.3       Glucose 91       Potassium 2.5       PROTEIN TOTAL 6.3       Sodium 140               Significant Imaging:  I have reviewed all pertinent imaging results/findings within the past 24 hours.  "

## 2024-09-13 NOTE — PROGRESS NOTES
[facility]  Critical Care Medicine  Progress Note    Patient Name: Ericka Vo  MRN: [id number]  Admission Date: 9/10/2024  Hospital Length of Stay: 2 days  Code Status: Full Code  Attending Provider: Martin Reddy MD  Primary Care Provider: Deysi Simeon FNP   Principal Problem: Rhabdomyolysis    Subjective:     HPI:  44-year-old female with a history of alcohol and polysubstance abuse who is basically healthy with no other chronic medical issues has a generalized anxiety disorder.  She was found after not being her from since Saturday yesterday on the [facility] want room with multiple bruises.  Patient was difficult to arouse at that time this morning she is awake alert seems confused.  New    Workup in the ER shows a normal CBC but elevated MCV.  Elevated creatinine, metabolic acidosis, abnormal liver function test, CPK 27,000, myoglobin positive, proBNP 2275, troponin 325, positive for benzodiazepines no alcohol or aspirin worsened medicine noted, urinalysis unremarkable arterial blood gases looks like were venous but had lactate 4.4.  CT head looks unremarkable report pending, chest x-ray unremarkable.    Hospital/ICU Course:  No notes on file    No new subjective & objective note has been filed under this hospital service since the last note was generated.      ABG  Recent Labs   Lab 09/10/24  2023   PH 7.23*   PO2 34   PCO2 37*   HCO3 15.5*     Assessment/Plan:     Neuro  Acute encephalopathy  Encephalopathy has resolved we did a CT of her neck because of some pain there is no evidence of any fractures some spasm otherwise no abnormalities some degenerative change    Cardiac/Vascular  Type 2 MI (myocardial infarction)  EKGs unremarkable repeat troponin probably demand ischemia    Renal/  REE (acute kidney injury)  Resolved is ready for transfer to the floor    Metabolic acidosis, increased anion gap  Resolved    GI  Elevated SGOT (AST)  Improving, negative hepatitis  profile may have some fatty liver    Orthopedic  * Rhabdomyolysis  CPKs down to 4000 but needs continued fluids    Other  Polysubstance abuse  Noted, drug screen positive for benzodiazepines    Current drinker of alcohol  Ongoing issue    Anxiety  Baseline problem uncertain what she takes for this home medicinesare Abdi Reddy MD  Critical Care Medicine  [facility]

## 2024-09-13 NOTE — ASSESSMENT & PLAN NOTE
Encephalopathy has resolved we did a CT of her neck because of some pain there is no evidence of any fractures some spasm otherwise no abnormalities some degenerative change

## 2024-09-14 PROBLEM — F17.200 TOBACCO DEPENDENCY: Status: ACTIVE | Noted: 2024-09-14

## 2024-09-14 LAB — CK SERPL-CCNC: 2087 U/L (ref 26–192)

## 2024-09-14 PROCEDURE — 11000001 HC ACUTE MED/SURG PRIVATE ROOM

## 2024-09-14 PROCEDURE — 36415 COLL VENOUS BLD VENIPUNCTURE: CPT | Performed by: INTERNAL MEDICINE

## 2024-09-14 PROCEDURE — 25000003 PHARM REV CODE 250: Performed by: INTERNAL MEDICINE

## 2024-09-14 PROCEDURE — 82550 ASSAY OF CK (CPK): CPT | Performed by: INTERNAL MEDICINE

## 2024-09-14 PROCEDURE — 99232 SBSQ HOSP IP/OBS MODERATE 35: CPT | Mod: ,,, | Performed by: FAMILY MEDICINE

## 2024-09-14 PROCEDURE — 94761 N-INVAS EAR/PLS OXIMETRY MLT: CPT

## 2024-09-14 PROCEDURE — 63600175 PHARM REV CODE 636 W HCPCS: Performed by: INTERNAL MEDICINE

## 2024-09-14 RX ORDER — IBUPROFEN 200 MG
1 TABLET ORAL DAILY
Status: DISCONTINUED | OUTPATIENT
Start: 2024-09-15 | End: 2024-09-15 | Stop reason: HOSPADM

## 2024-09-14 RX ORDER — LORAZEPAM 2 MG/ML
2 INJECTION INTRAMUSCULAR
Status: DISCONTINUED | OUTPATIENT
Start: 2024-09-14 | End: 2024-09-15 | Stop reason: HOSPADM

## 2024-09-14 RX ADMIN — SODIUM CHLORIDE: 450 INJECTION, SOLUTION INTRAVENOUS at 07:09

## 2024-09-14 RX ADMIN — POTASSIUM CHLORIDE 30 MEQ: 750 TABLET, EXTENDED RELEASE ORAL at 08:09

## 2024-09-14 RX ADMIN — SODIUM CHLORIDE: 450 INJECTION, SOLUTION INTRAVENOUS at 09:09

## 2024-09-14 RX ADMIN — MUPIROCIN: 20 OINTMENT TOPICAL at 08:09

## 2024-09-14 RX ADMIN — BUSPIRONE HYDROCHLORIDE 10 MG: 5 TABLET ORAL at 09:09

## 2024-09-14 RX ADMIN — POTASSIUM CHLORIDE 30 MEQ: 750 TABLET, EXTENDED RELEASE ORAL at 03:09

## 2024-09-14 RX ADMIN — THIAMINE HYDROCHLORIDE 500 MG: 100 INJECTION, SOLUTION INTRAMUSCULAR; INTRAVENOUS at 09:09

## 2024-09-14 RX ADMIN — BUSPIRONE HYDROCHLORIDE 10 MG: 5 TABLET ORAL at 08:09

## 2024-09-14 RX ADMIN — MUPIROCIN: 20 OINTMENT TOPICAL at 09:09

## 2024-09-14 RX ADMIN — THIAMINE HYDROCHLORIDE 500 MG: 100 INJECTION, SOLUTION INTRAMUSCULAR; INTRAVENOUS at 04:09

## 2024-09-14 RX ADMIN — FOLIC ACID 1 MG: 5 INJECTION, SOLUTION INTRAMUSCULAR; INTRAVENOUS; SUBCUTANEOUS at 10:09

## 2024-09-14 RX ADMIN — POTASSIUM CHLORIDE 30 MEQ: 750 TABLET, EXTENDED RELEASE ORAL at 09:09

## 2024-09-14 RX ADMIN — THIAMINE HYDROCHLORIDE 500 MG: 100 INJECTION, SOLUTION INTRAMUSCULAR; INTRAVENOUS at 08:09

## 2024-09-14 NOTE — PLAN OF CARE
Problem: Adult Inpatient Plan of Care  Goal: Absence of Hospital-Acquired Illness or Injury  Outcome: Progressing  Intervention: Identify and Manage Fall Risk  Flowsheets (Taken 9/14/2024 1818)  Safety Promotion/Fall Prevention:   assistive device/personal item within reach   bed alarm set   instructed to call staff for mobility   lighting adjusted  Goal: Optimal Comfort and Wellbeing  Outcome: Progressing  Intervention: Monitor Pain and Promote Comfort  Flowsheets (Taken 9/14/2024 1827)  Pain Management Interventions:   medication offered   position adjusted   relaxation techniques promoted   quiet environment facilitated  Intervention: Provide Person-Centered Care  Flowsheets (Taken 9/14/2024 1827)  Trust Relationship/Rapport:   care explained   questions answered   thoughts/feelings acknowledged   questions encouraged

## 2024-09-14 NOTE — SUBJECTIVE & OBJECTIVE
Interval History/Significant Events:  Patient without complaints    Review of Systems   Constitutional:  Negative for activity change, chills, diaphoresis and fever.   Respiratory:  Negative for shortness of breath.    Cardiovascular:  Negative for chest pain.   Gastrointestinal:  Negative for nausea and vomiting.   Skin:  Negative for rash and wound.   Psychiatric/Behavioral:  Negative for confusion.      Objective:     Vital Signs (Most Recent):  Temp: 98.4 °F (36.9 °C) (09/14/24 1554)  Pulse: 83 (09/14/24 1554)  Resp: 20 (09/14/24 1554)  BP: 134/89 (09/14/24 1554)  SpO2: 100 % (09/14/24 1554) Vital Signs (24h Range):  Temp:  [98.1 °F (36.7 °C)-98.4 °F (36.9 °C)] 98.4 °F (36.9 °C)  Pulse:  [] 83  Resp:  [16-21] 20  SpO2:  [92 %-100 %] 100 %  BP: (115-141)/(78-91) 134/89   Weight: 41.8 kg (92 lb 2.4 oz)  Body mass index is 15.82 kg/m².      Intake/Output Summary (Last 24 hours) at 9/14/2024 1709  Last data filed at 9/14/2024 1400  Gross per 24 hour   Intake 2789.27 ml   Output --   Net 2789.27 ml          Physical Exam  Vitals reviewed.   Constitutional:       Appearance: Normal appearance.      Interventions: She is not intubated.  HENT:      Head: Normocephalic and atraumatic.      Nose: Nose normal.      Mouth/Throat:      Mouth: Mucous membranes are dry.      Pharynx: Oropharynx is clear.   Eyes:      Extraocular Movements: Extraocular movements intact.      Conjunctiva/sclera: Conjunctivae normal.      Pupils: Pupils are equal, round, and reactive to light.   Cardiovascular:      Rate and Rhythm: Normal rate.      Heart sounds: Normal heart sounds. No murmur heard.  Pulmonary:      Effort: Pulmonary effort is normal. She is not intubated.      Breath sounds: Normal breath sounds.   Abdominal:      General: Abdomen is flat. Bowel sounds are normal.      Palpations: Abdomen is soft.   Musculoskeletal:         General: Normal range of motion.      Cervical back: Normal range of motion and neck supple.       "Right lower leg: No edema.      Left lower leg: No edema.   Skin:     General: Skin is warm and dry.      Capillary Refill: Capillary refill takes less than 2 seconds.   Neurological:      General: No focal deficit present.      Mental Status: She is alert and oriented to person, place, and time.   Psychiatric:         Mood and Affect: Mood normal.         Behavior: Behavior normal.            Vents:  Oxygen Concentration (%): 21 (09/14/24 0602)  Lines/Drains/Airways       Peripheral Intravenous Line  Duration                  Peripheral IV - Single Lumen 09/11/24 0150 22 G 1 in No Left Antecubital 3 days         Peripheral IV - Single Lumen 09/12/24 1032 20 G No Anterior;Right Forearm 2 days                  Significant Labs:    CBC/Anemia Profile:  No results for input(s): "WBC", "HGB", "HCT", "PLT", "MCV", "RDW", "IRON", "FERRITIN", "RETIC", "FOLATE", "FOEFQTLC70", "OCCULTBLOOD" in the last 48 hours.     Chemistries:  Recent Labs   Lab 09/13/24  0515      K 2.5*      CO2 34*   BUN 2*   CREATININE 0.47*   CALCIUM 8.0*   ALBUMIN 3.0*   PROT 6.3*   BILITOT 0.9   ALKPHOS 57   *   *   MG 1.7       Recent Lab Results         09/14/24  0451        CPK 2,087               Significant Imaging:  I have reviewed all pertinent imaging results/findings within the past 24 hours.  "

## 2024-09-14 NOTE — PROGRESS NOTES
- Continue Atorvastatin 40 mg daily  [facility] Hospital Medicine  Progress Note    Patient Name: Ericka Vo  MRN: [id number]  Patient Class: IP- Inpatient   Admission Date: 9/10/2024  Length of Stay: 3 days  Attending Physician: Floyd Kelly DO  Primary Care Provider: Deysi Simeon FNP        Subjective:     Principal Problem:Rhabdomyolysis        HPI:  Patient is a 44-year-old female with a history of alcohol and polysubstance abuse but otherwise seemingly healthy with no other known chronic medical issues other than generalized anxiety disorder who was brought in by EMS from her house earlier today after she was found down with multiple bruises on the floor of laundry room.  Last time known normal was last Saturday.  When patient's children arrived at the scene, patient was extremely difficult to arouse and appears significantly confused which ultimately culminated in ED visit.    EN route to the hospital, patient was found to be hypotensive and bradycardic and thus patient was started on IV fluid and given a dose of atropine which she improve both the heart rate and blood pressure    Workup in ED was notable mildly elevated troponin with flat delta troponin level coupled with nonspecific T-wave abnormalities seen on EKG with no previous EKG for comparison, elevated myoglobin level with a myoglobin level of greater than 10,000, serum creatinine of 1.95 from a baseline serum creatinine of 0.57 and UDS positive for presence of benzodiazepine    Patient will be admitted for further evaluation and intervention    Overview/Hospital Course:  9/14- patient was seen examined today resting comfortably in bed, in no acute distress, no acute events overnight.  The patient has rhabdomyolysis continues to improve with IV fluids and increased p.o. intake.  CK is now down from 27,032 to 2087.  Patient states she feels much better.  Complains of left hand bruising and pain today.  Some weakness with her   strength as well.  Left hand x-ray pending.  Complains that trazodone has not been working great as a sleep aid nightly.  She also expresses concerns of alcohol withdrawal but has been 6 days since her last drink.  Labs in a.m..  If satisfactory, consider discharge home.    Interval History/Significant Events:  Patient without complaints    Review of Systems   Constitutional:  Negative for activity change, chills, diaphoresis and fever.   Respiratory:  Negative for shortness of breath.    Cardiovascular:  Negative for chest pain.   Gastrointestinal:  Negative for nausea and vomiting.   Skin:  Negative for rash and wound.   Psychiatric/Behavioral:  Negative for confusion.      Objective:     Vital Signs (Most Recent):  Temp: 98.4 °F (36.9 °C) (09/14/24 1554)  Pulse: 83 (09/14/24 1554)  Resp: 20 (09/14/24 1554)  BP: 134/89 (09/14/24 1554)  SpO2: 100 % (09/14/24 1554) Vital Signs (24h Range):  Temp:  [98.1 °F (36.7 °C)-98.4 °F (36.9 °C)] 98.4 °F (36.9 °C)  Pulse:  [] 83  Resp:  [16-21] 20  SpO2:  [92 %-100 %] 100 %  BP: (115-141)/(78-91) 134/89   Weight: 41.8 kg (92 lb 2.4 oz)  Body mass index is 15.82 kg/m².      Intake/Output Summary (Last 24 hours) at 9/14/2024 1709  Last data filed at 9/14/2024 1400  Gross per 24 hour   Intake 2789.27 ml   Output --   Net 2789.27 ml          Physical Exam  Vitals reviewed.   Constitutional:       Appearance: Normal appearance.      Interventions: She is not intubated.  HENT:      Head: Normocephalic and atraumatic.      Nose: Nose normal.      Mouth/Throat:      Mouth: Mucous membranes are dry.      Pharynx: Oropharynx is clear.   Eyes:      Extraocular Movements: Extraocular movements intact.      Conjunctiva/sclera: Conjunctivae normal.      Pupils: Pupils are equal, round, and reactive to light.   Cardiovascular:      Rate and Rhythm: Normal rate.      Heart sounds: Normal heart sounds. No murmur heard.  Pulmonary:      Effort: Pulmonary effort is normal. She is not  "intubated.      Breath sounds: Normal breath sounds.   Abdominal:      General: Abdomen is flat. Bowel sounds are normal.      Palpations: Abdomen is soft.   Musculoskeletal:         General: Normal range of motion.      Cervical back: Normal range of motion and neck supple.      Right lower leg: No edema.      Left lower leg: No edema.   Skin:     General: Skin is warm and dry.      Capillary Refill: Capillary refill takes less than 2 seconds.   Neurological:      General: No focal deficit present.      Mental Status: She is alert and oriented to person, place, and time.   Psychiatric:         Mood and Affect: Mood normal.         Behavior: Behavior normal.            Vents:  Oxygen Concentration (%): 21 (09/14/24 0602)  Lines/Drains/Airways       Peripheral Intravenous Line  Duration                  Peripheral IV - Single Lumen 09/11/24 0150 22 G 1 in No Left Antecubital 3 days         Peripheral IV - Single Lumen 09/12/24 1032 20 G No Anterior;Right Forearm 2 days                  Significant Labs:    CBC/Anemia Profile:  No results for input(s): "WBC", "HGB", "HCT", "PLT", "MCV", "RDW", "IRON", "FERRITIN", "RETIC", "FOLATE", "[id number]", "OCCULTBLOOD" in the last 48 hours.     Chemistries:  Recent Labs   Lab 09/13/24  0515      K 2.5*      CO2 34*   BUN 2*   CREATININE 0.47*   CALCIUM 8.0*   ALBUMIN 3.0*   PROT 6.3*   BILITOT 0.9   ALKPHOS 57   *   *   MG 1.7       Recent Lab Results         09/14/24  0451        CPK 2,087               Significant Imaging:  I have reviewed all pertinent imaging results/findings within the past 24 hours.    Assessment/Plan:      * Rhabdomyolysis    Patient was found to have myoglobin of 10,500 along with acute renal failure with serum creatinine of 1.95 from a baseline serum creatinine of 0.57.  Will aggressively hydrate this patient and alkalinize his urine as well.          Elevated SGOT (AST)    Likely due to a significantly elevated myoglobin " level.  Will monitor      Type 2 MI (myocardial infarction)    This is likely secondary to type 2 MI associated with severe rhabdomyolysis.  Do not think that this represents ACS.  Will continue to trend troponin level and proceed with echocardiogram in a.m.      REE (acute kidney injury)    REE is likely secondary to dehydration/rhabdomyolysis.  Will start patient on aggressive IV fluid resuscitation and alkalize urine to make urine pH greater than 6.5.  Will monitor urine output and serum creatinine closely    Metabolic acidosis, increased anion gap    This is secondary to rhabdomyolysis.  Expect this to resolve with IV fluid resuscitation      Acute encephalopathy    Feel that patient's acute encephalopathy is stemming from accidental overdose of Xanax and Klonopin coupled with consuming a large amount of alcohol.  Will provide supportive care.  Will start patient on high-intensity thiamine and folic acid level.  Will institute CIWA led protocol once patient shows signs and symptoms of alcohol withdrawal.  Will require frequent neuro check      Polysubstance abuse    Patient is a known Klonopin and Ativan abuser.  Once patient's mental status returned to baseline, feel that patient could be discharged with scheduled Klonopin and follow-up with a psychiatrist      Current drinker of alcohol    Exact amount of alcohol patient consumes on a regular basis unknown at this time.  However since patient has acute metabolic encephalopathy secondary to Klonopin and Ativan will hold off on starting on CIWA protocol until patient shows some signs of alcohol withdrawal.  High-intensity thiamine and folic acid as stated above      Anxiety    Continue present management with BuSpar.  Feel that patient can benefit from outpatient psychiatry        VTE Risk Mitigation (From admission, onward)           Ordered     IP VTE LOW RISK PATIENT  Once         09/11/24 0156     Place sequential compression device  Until discontinued          09/11/24 0156                    Discharge Planning   BOB:      Code Status: Full Code   Is the patient medically ready for discharge?:     Reason for patient still in hospital (select all that apply): Treatment and Pending disposition  Discharge Plan A: Home                  Floyd Kelly DO  Department of Hospital Medicine   [facility]

## 2024-09-14 NOTE — HOSPITAL COURSE
9/14- patient was seen examined today resting comfortably in bed, in no acute distress, no acute events overnight.  The patient has rhabdomyolysis continues to improve with IV fluids and increased p.o. intake.  CK is now down from 27,032 to 2087.  Patient states she feels much better.  Complains of left hand bruising and pain today.  Some weakness with her  strength as well.  Left hand x-ray pending.  Complains that trazodone has not been working great as a sleep aid nightly.  She also expresses concerns of alcohol withdrawal but has been 6 days since her last drink.  Labs in a.m..  If satisfactory, consider discharge home.  9/15- the patient was seen examined today resting comfortably in bed and in no acute distress.  The patient states that she feels much better and feels like she is ready for discharge home.  CK is down to 1062, down from an initial level of 27,000. She has been receiving IV fluids since admission with good results.  Performed x-ray of left hand yesterday which was negative for fracture.  Potassium remains low at 3.2 in his being replaced.  Labs otherwise unremarkable.  Patient was also started on boost from this admission.  We will continue that with the prescription as an outpatient.  Her brother, states that the patient have some hallucinations last night in his concern that she could have possibly taken something unbeknownst to the family or medical staff.  Family members concerned of possible substances being brought to the hospital that she could possibly use without the knowledge of family or medical staff.  We will perform a UDS before her discharge today.  Otherwise, I believe the patient is medically stable and ready for discharge home at this time.  If she continues to have substance or alcohol abuse problems she we will need to check into an outpatient rehab such as [facility] or [facility].  She is encouraged to follow up with her PCP next week and medication refills.  The  patient will be discharged home to self-care.  Patient voices understanding and agrees to plan stated above.

## 2024-09-15 VITALS
TEMPERATURE: 99 F | HEART RATE: 88 BPM | DIASTOLIC BLOOD PRESSURE: 88 MMHG | SYSTOLIC BLOOD PRESSURE: 138 MMHG | WEIGHT: 100.5 LBS | RESPIRATION RATE: 16 BRPM | HEIGHT: 64 IN | OXYGEN SATURATION: 100 % | BODY MASS INDEX: 17.16 KG/M2

## 2024-09-15 PROBLEM — M62.82 RHABDOMYOLYSIS: Status: RESOLVED | Noted: 2024-09-11 | Resolved: 2024-09-15

## 2024-09-15 PROBLEM — I21.A1 TYPE 2 MI (MYOCARDIAL INFARCTION): Status: RESOLVED | Noted: 2024-09-11 | Resolved: 2024-09-15

## 2024-09-15 PROBLEM — G93.40 ACUTE ENCEPHALOPATHY: Status: RESOLVED | Noted: 2024-09-11 | Resolved: 2024-09-15

## 2024-09-15 PROBLEM — F19.10 POLYSUBSTANCE ABUSE: Status: RESOLVED | Noted: 2022-09-06 | Resolved: 2024-09-15

## 2024-09-15 PROBLEM — T79.6XXA TRAUMATIC RHABDOMYOLYSIS: Status: ACTIVE | Noted: 2024-09-11

## 2024-09-15 PROBLEM — Z78.9 CURRENT DRINKER OF ALCOHOL: Status: RESOLVED | Noted: 2022-09-06 | Resolved: 2024-09-15

## 2024-09-15 PROBLEM — R74.01 ELEVATED SGOT (AST): Status: RESOLVED | Noted: 2024-09-11 | Resolved: 2024-09-15

## 2024-09-15 PROBLEM — N17.9 AKI (ACUTE KIDNEY INJURY): Status: RESOLVED | Noted: 2024-09-11 | Resolved: 2024-09-15

## 2024-09-15 PROBLEM — E87.29 METABOLIC ACIDOSIS, INCREASED ANION GAP: Status: RESOLVED | Noted: 2024-09-11 | Resolved: 2024-09-15

## 2024-09-15 LAB
AMPHET UR QL SCN: NEGATIVE
ANION GAP SERPL CALCULATED.3IONS-SCNC: 7 MMOL/L (ref 7–16)
BARBITURATES UR QL SCN: NEGATIVE
BASOPHILS # BLD AUTO: 0.03 K/UL (ref 0–0.2)
BASOPHILS NFR BLD AUTO: 0.5 % (ref 0–1)
BENZODIAZ METAB UR QL SCN: NEGATIVE
BUN SERPL-MCNC: 2 MG/DL (ref 7–18)
BUN/CREAT SERPL: 4 (ref 6–20)
CALCIUM SERPL-MCNC: 9 MG/DL (ref 8.5–10.1)
CANNABINOIDS UR QL SCN: NEGATIVE
CHLORIDE SERPL-SCNC: 107 MMOL/L (ref 98–107)
CK SERPL-CCNC: 1062 U/L (ref 26–192)
CO2 SERPL-SCNC: 29 MMOL/L (ref 21–32)
COCAINE UR QL SCN: NEGATIVE
CREAT SERPL-MCNC: 0.54 MG/DL (ref 0.55–1.02)
DIFFERENTIAL METHOD BLD: ABNORMAL
EGFR (NO RACE VARIABLE) (RUSH/TITUS): 115 ML/MIN/1.73M2
EOSINOPHIL # BLD AUTO: 0.08 K/UL (ref 0–0.5)
EOSINOPHIL NFR BLD AUTO: 1.4 % (ref 1–4)
ERYTHROCYTE [DISTWIDTH] IN BLOOD BY AUTOMATED COUNT: 11.8 % (ref 11.5–14.5)
GLUCOSE SERPL-MCNC: 93 MG/DL (ref 74–106)
HCT VFR BLD AUTO: 35.3 % (ref 38–47)
HGB BLD-MCNC: 11.9 G/DL (ref 12–16)
IMM GRANULOCYTES # BLD AUTO: 0.02 K/UL (ref 0–0.04)
IMM GRANULOCYTES NFR BLD: 0.4 % (ref 0–0.4)
LYMPHOCYTES # BLD AUTO: 1.27 K/UL (ref 1–4.8)
LYMPHOCYTES NFR BLD AUTO: 22.3 % (ref 27–41)
MACROCYTES BLD QL SMEAR: ABNORMAL
MCH RBC QN AUTO: 35.6 PG (ref 27–31)
MCHC RBC AUTO-ENTMCNC: 33.7 G/DL (ref 32–36)
MCV RBC AUTO: 105.7 FL (ref 80–96)
MONOCYTES # BLD AUTO: 0.92 K/UL (ref 0–0.8)
MONOCYTES NFR BLD AUTO: 16.1 % (ref 2–6)
MPC BLD CALC-MCNC: 10.4 FL (ref 9.4–12.4)
NEUTROPHILS # BLD AUTO: 3.38 K/UL (ref 1.8–7.7)
NEUTROPHILS NFR BLD AUTO: 59.3 % (ref 53–65)
NRBC # BLD AUTO: 0 X10E3/UL
NRBC, AUTO (.00): 0 %
OPIATES UR QL SCN: NEGATIVE
PCP UR QL SCN: NEGATIVE
PLATELET # BLD AUTO: 145 K/UL (ref 150–400)
PLATELET MORPHOLOGY: ABNORMAL
POLYCHROMASIA BLD QL SMEAR: ABNORMAL
POTASSIUM SERPL-SCNC: 3.2 MMOL/L (ref 3.5–5.1)
RBC # BLD AUTO: 3.34 M/UL (ref 4.2–5.4)
SODIUM SERPL-SCNC: 140 MMOL/L (ref 136–145)
STOMATOCYTES BLD QL SMEAR: ABNORMAL
TARGETS BLD QL SMEAR: ABNORMAL
WBC # BLD AUTO: 5.7 K/UL (ref 4.5–11)

## 2024-09-15 PROCEDURE — 25000003 PHARM REV CODE 250: Performed by: INTERNAL MEDICINE

## 2024-09-15 PROCEDURE — 94761 N-INVAS EAR/PLS OXIMETRY MLT: CPT

## 2024-09-15 PROCEDURE — 63600175 PHARM REV CODE 636 W HCPCS: Performed by: INTERNAL MEDICINE

## 2024-09-15 PROCEDURE — 80307 DRUG TEST PRSMV CHEM ANLYZR: CPT | Performed by: FAMILY MEDICINE

## 2024-09-15 PROCEDURE — 82550 ASSAY OF CK (CPK): CPT | Performed by: INTERNAL MEDICINE

## 2024-09-15 PROCEDURE — 36415 COLL VENOUS BLD VENIPUNCTURE: CPT | Performed by: FAMILY MEDICINE

## 2024-09-15 PROCEDURE — 80048 BASIC METABOLIC PNL TOTAL CA: CPT | Performed by: FAMILY MEDICINE

## 2024-09-15 PROCEDURE — 99238 HOSP IP/OBS DSCHRG MGMT 30/<: CPT | Mod: ,,, | Performed by: FAMILY MEDICINE

## 2024-09-15 PROCEDURE — 85025 COMPLETE CBC W/AUTO DIFF WBC: CPT | Performed by: FAMILY MEDICINE

## 2024-09-15 RX ORDER — FOLIC ACID 1 MG/1
1 TABLET ORAL DAILY
Status: DISCONTINUED | OUTPATIENT
Start: 2024-09-16 | End: 2024-09-15 | Stop reason: HOSPADM

## 2024-09-15 RX ORDER — BUSPIRONE HYDROCHLORIDE 10 MG/1
10 TABLET ORAL 2 TIMES DAILY
Qty: 60 TABLET | Refills: 0 | Status: SHIPPED | OUTPATIENT
Start: 2024-09-15

## 2024-09-15 RX ADMIN — POTASSIUM CHLORIDE 30 MEQ: 750 TABLET, EXTENDED RELEASE ORAL at 09:09

## 2024-09-15 RX ADMIN — MUPIROCIN: 20 OINTMENT TOPICAL at 09:09

## 2024-09-15 RX ADMIN — SODIUM CHLORIDE: 450 INJECTION, SOLUTION INTRAVENOUS at 09:09

## 2024-09-15 RX ADMIN — FOLIC ACID 1 MG: 5 INJECTION, SOLUTION INTRAMUSCULAR; INTRAVENOUS; SUBCUTANEOUS at 09:09

## 2024-09-15 RX ADMIN — POTASSIUM CHLORIDE 30 MEQ: 750 TABLET, EXTENDED RELEASE ORAL at 03:09

## 2024-09-15 RX ADMIN — BUSPIRONE HYDROCHLORIDE 10 MG: 5 TABLET ORAL at 09:09

## 2024-09-15 RX ADMIN — THIAMINE HYDROCHLORIDE 500 MG: 100 INJECTION, SOLUTION INTRAMUSCULAR; INTRAVENOUS at 03:09

## 2024-09-15 RX ADMIN — THIAMINE HYDROCHLORIDE 500 MG: 100 INJECTION, SOLUTION INTRAMUSCULAR; INTRAVENOUS at 09:09

## 2024-09-15 NOTE — PLAN OF CARE
Problem: Adult Inpatient Plan of Care  Goal: Plan of Care Review  9/15/2024 1555 by Briana Dean RN  Outcome: Met  9/15/2024 1554 by Briana Dean RN  Outcome: Progressing  Goal: Patient-Specific Goal (Individualized)  9/15/2024 1555 by Briana Dean RN  Outcome: Met  9/15/2024 1554 by Briana Dean RN  Outcome: Progressing  Goal: Absence of Hospital-Acquired Illness or Injury  9/15/2024 1555 by Briana Dean RN  Outcome: Met  9/15/2024 1554 by Briana Dean RN  Outcome: Progressing  Goal: Optimal Comfort and Wellbeing  9/15/2024 1555 by Briana Dean RN  Outcome: Met  9/15/2024 1554 by Briana Dean RN  Outcome: Progressing  Goal: Readiness for Transition of Care  9/15/2024 1555 by Briana Dean RN  Outcome: Met  9/15/2024 1554 by Briana Dean RN  Outcome: Progressing     Problem: Acute Kidney Injury/Impairment  Goal: Fluid and Electrolyte Balance  9/15/2024 1555 by Briana Dean RN  Outcome: Met  9/15/2024 1554 by Briana Dean RN  Outcome: Progressing  Goal: Improved Oral Intake  9/15/2024 1555 by Briana Dean RN  Outcome: Met  9/15/2024 1554 by Briana Dean RN  Outcome: Progressing  Goal: Effective Renal Function  9/15/2024 1555 by Briana Dean RN  Outcome: Met  9/15/2024 1554 by Briana Dean RN  Outcome: Progressing     Problem: Skin Injury Risk Increased  Goal: Skin Health and Integrity  9/15/2024 1555 by Briana Dean RN  Outcome: Met  9/15/2024 1554 by Briana Dean RN  Outcome: Progressing     Problem: Wound  Goal: Optimal Coping  9/15/2024 1555 by Briana Dean RN  Outcome: Met  9/15/2024 1554 by Briana Dean RN  Outcome: Progressing  Goal: Optimal Functional Ability  9/15/2024 1555 by Briana Dean RN  Outcome: Met  9/15/2024 1554 by Dianne, Briana, RN  Outcome: Progressing  Goal: Absence of Infection Signs and Symptoms  9/15/2024 1555 by Briana Dean, RN  Outcome: Met  9/15/2024 1554 by Briana Dean, RN  Outcome:  Progressing  Goal: Improved Oral Intake  9/15/2024 1555 by Briana Dean, RN  Outcome: Met  9/15/2024 1554 by Briana Dean RN  Outcome: Progressing  Goal: Optimal Pain Control and Function  9/15/2024 1555 by Briana Dean, RN  Outcome: Met  9/15/2024 1554 by Briana Dean, RN  Outcome: Progressing  Goal: Skin Health and Integrity  9/15/2024 1555 by Briana Dean RN  Outcome: Met  9/15/2024 1554 by Briana Dean, RN  Outcome: Progressing  Goal: Optimal Wound Healing  9/15/2024 1555 by Briana Dean, RN  Outcome: Met  9/15/2024 1554 by Briana Dean, RN  Outcome: Progressing

## 2024-09-15 NOTE — DISCHARGE SUMMARY
[facility] Hospital Medicine  Discharge Summary      Patient Name: Ericka Vo  MRN: [id number]  TEX: [id number]  Patient Class: IP- Inpatient  Admission Date: 9/10/2024  Hospital Length of Stay: 4 days  Discharge Date and Time:  09/15/2024 3:39 PM  Attending Physician: Floyd Kelly DO   Discharging Provider: Floyd Kelly DO  Primary Care Provider: Deysi Simeon FNP    Primary Care Team: Networked reference to record PCT     HPI:   Patient is a 44-year-old female with a history of alcohol and polysubstance abuse but otherwise seemingly healthy with no other known chronic medical issues other than generalized anxiety disorder who was brought in by EMS from her house earlier today after she was found down with multiple bruises on the floor of laundry room.  Last time known normal was last Saturday.  When patient's children arrived at the scene, patient was extremely difficult to arouse and appears significantly confused which ultimately culminated in ED visit.    EN route to the hospital, patient was found to be hypotensive and bradycardic and thus patient was started on IV fluid and given a dose of atropine which she improve both the heart rate and blood pressure    Workup in ED was notable mildly elevated troponin with flat delta troponin level coupled with nonspecific T-wave abnormalities seen on EKG with no previous EKG for comparison, elevated myoglobin level with a myoglobin level of greater than 10,000, serum creatinine of 1.95 from a baseline serum creatinine of 0.57 and UDS positive for presence of benzodiazepine    Patient will be admitted for further evaluation and intervention    * No surgery found *      Hospital Course:   9/14- patient was seen examined today resting comfortably in bed, in no acute distress, no acute events overnight.  The patient has rhabdomyolysis continues to improve with IV fluids and increased p.o. intake.  CK is now down  from 27,032 to 2087.  Patient states she feels much better.  Complains of left hand bruising and pain today.  Some weakness with her  strength as well.  Left hand x-ray pending.  Complains that trazodone has not been working great as a sleep aid nightly.  She also expresses concerns of alcohol withdrawal but has been 6 days since her last drink.  Labs in a.m..  If satisfactory, consider discharge home.  9/15- the patient was seen examined today resting comfortably in bed and in no acute distress.  The patient states that she feels much better and feels like she is ready for discharge home.  CK is down to 1062, down from an initial level of 27,000. She has been receiving IV fluids since admission with good results.  Performed x-ray of left hand yesterday which was negative for fracture.  Potassium remains low at 3.2 in his being replaced.  Labs otherwise unremarkable.  Patient was also started on boost from this admission.  We will continue that with the prescription as an outpatient.  Her brother, states that the patient have some hallucinations last night in his concern that she could have possibly taken something unbeknownst to the family or medical staff.  Family members concerned of possible substances being brought to the hospital that she could possibly use without the knowledge of family or medical staff.  We will perform a UDS before her discharge today.  Otherwise, I believe the patient is medically stable and ready for discharge home at this time.  If she continues to have substance or alcohol abuse problems she we will need to check into an outpatient rehab such as [facility] or [facility].  She is encouraged to follow up with her PCP next week and medication refills.  The patient will be discharged home to self-care.  Patient voices understanding and agrees to plan stated above.       Goals of Care Treatment Preferences:  Code Status: Full Code      SDOH Screening:  The patient was screened for  food insecurity, housing instability, transportation needs, utility difficulties, and interpersonal safety. The social determinant(s) of health identified as a concern this admission are:  Interpersonal Safety    The plan to address these concerns is:  Discharge home with family members.          Consults:     No new Assessment & Plan notes have been filed under this hospital service since the last note was generated.  Service: Hospital Medicine    Final Active Diagnoses:    Diagnosis Date Noted POA    Tobacco dependency [F17.200] 09/14/2024 Yes    Anxiety [F41.9] 09/06/2022 Yes      Problems Resolved During this Admission:    Diagnosis Date Noted Date Resolved POA    PRINCIPAL PROBLEM:  Rhabdomyolysis [M62.82] 09/11/2024 09/15/2024 Yes    Acute encephalopathy [G93.40] 09/11/2024 09/15/2024 Yes    Metabolic acidosis, increased anion gap [E87.29] 09/11/2024 09/15/2024 Yes    REE (acute kidney injury) [N17.9] 09/11/2024 09/15/2024 Yes    Type 2 MI (myocardial infarction) [I21.A1] 09/11/2024 09/15/2024 Yes    Elevated SGOT (AST) [R74.01] 09/11/2024 09/15/2024 Yes    Current drinker of alcohol [Z78.9] 09/06/2022 09/15/2024 Yes    Polysubstance abuse [F19.10] 09/06/2022 09/15/2024 Yes       Discharged Condition: good    Disposition: Home or Self Care    Follow Up:    Patient Instructions:      Diet Adult Regular     Notify your health care provider if you experience any of the following:  increased confusion or weakness     Notify your health care provider if you experience any of the following:  persistent dizziness, light-headedness, or visual disturbances     Notify your health care provider if you experience any of the following:  severe persistent headache     Reason for not Ordering Smoking Cessation Referral     Order Specific Question Answer Comments   Reason for not ordering: Not medically appropriate at this time      Reason for not Prescribing Nicotine Replacement     Order Specific Question Answer Comments    Reason for not Prescribing: Not medically appropriate at this time      Activity as tolerated       Significant Diagnostic Studies: Labs: All labs within the past 24 hours have been reviewed    Pending Diagnostic Studies:       Procedure Component Value Units Date/Time    Drug Screen, Urine [[id number]] Collected: 09/15/24 1514    Order Status: Sent Lab Status: In process Updated: 09/15/24 1518    Specimen: Urine, Clean Catch     Drugs of Abuse Screen, Blood [[id number]] Collected: 09/11/24 1704    Order Status: Sent Lab Status: In process Updated: 09/11/24 1706    Specimen: Blood     EKG 12-lead [[id number]]     Order Status: Sent Lab Status: No result     EXTRA TUBES [[id number]] Collected: 09/11/24 0544    Order Status: Sent Lab Status: In process Updated: 09/11/24 0557    Specimen: Blood, Venous     Narrative:      The following orders were created for panel order EXTRA TUBES.  Procedure                               Abnormality         Status                     ---------                               -----------         ------                     Lavender Top Hold[[id number]]                               In process                   Please view results for these tests on the individual orders.    [place] GENERIC ORDERABLE CSMPU - Overview: Controlled Substance Monitoring Panel, Random, Urine [[id number]] Collected: 09/11/24 1711    Order Status: Sent Lab Status: In process Updated: 09/12/24 1020    Specimen: Other from Urine            Medications:  Reconciled Home Medications:      Medication List        CONTINUE taking these medications      busPIRone 10 MG tablet  Commonly known as: BUSPAR  Take 1 tablet (10 mg total) by mouth 2 (two) times daily.            STOP taking these medications      varenicline 0.5 mg (11)- 1 mg (42) tablet  Commonly known as: CHANTIX STARTING MONTH BOX              Indwelling Lines/Drains at time of discharge:   Lines/Drains/Airways       None                   Time spent on the  discharge of patient: 30 minutes         Floyd Kelly DO  Department of Hospital Medicine  [facility] - [address] [place] [address]

## 2024-09-15 NOTE — PROGRESS NOTES
Pharmacist Intervention IV to PO Note    Ericka Vo is a 44 y.o. female being treated with IV medication folic acid    Patient Data:    Vital Signs (Most Recent):  Temp: 98.7 °F (37.1 °C) (09/15/24 1140)  Pulse: 88 (09/15/24 1140)  Resp: 16 (09/15/24 1140)  BP: 138/88 (09/15/24 1140)  SpO2: 100 % (09/15/24 1140) Vital Signs (72h Range):  Temp:  [97.6 °F (36.4 °C)-99 °F (37.2 °C)]   Pulse:  []   Resp:  [8-23]   BP: (105-161)/()   SpO2:  [92 %-100 %]      CBC:  Recent Labs   Lab 09/10/24  2023 09/10/24  2031 09/15/24  0453   WBC  --  8.96 5.70   RBC  --  4.35 3.34*   HGB  --  15.5 11.9*   HCT 48 46.0 35.3*   PLT  --  204 145*   MCV  --  105.7* 105.7*   MCH  --  35.6* 35.6*   MCHC  --  33.7 33.7     CMP:     Recent Labs   Lab 09/11/24  0544 09/12/24 0452 09/13/24  0515 09/15/24  0453   * 138* 91 93   CALCIUM 7.3* 7.7* 8.0* 9.0   ALBUMIN 2.7* 3.0* 3.0*  --    PROT 5.9* 6.0* 6.3*  --     142 140 140   K 3.9 2.7* 2.5* 3.2*   CO2 17* 37* 34* 29   * 101 103 107   BUN 27* 3* 2* 2*   CREATININE 0.92 0.58 0.47* 0.54*   ALKPHOS 52 55 57  --    * 115* 110*  --    * 321* 246*  --    BILITOT 0.7 0.7 0.9  --        Dietary Orders:  Diet Orders            Diet Adult Regular: Regular starting at 09/12 0755            Based on the following criteria, this patient qualifies for intravenous to oral conversion:  [x] The patients gastrointestinal tract is functioning (tolerating medications via oral or enteral route for 24 hours and tolerating food or enteral feeds for 24 hours).  [x] The patient is hemodynamically stable for 24 hours (heart rate <100 beats per minute, systolic blood pressure >99 mm Hg, and respiratory rate <20 breaths per minute).  [x] The patient shows clinical improvement (afebrile for at least 24 hours and white blood cell count downtrending or normalized). Additionally, the patient must be non-neutropenic (absolute neutrophil count >500 cells/mm3).  [x] For  antimicrobials, the patient has received IV therapy for at least 24 hours.    IV medication folic acid will be changed to oral medication folic acid    Pharmacist's Name: Jaylin Lloyd  Pharmacist's Extension: [phone]

## 2024-09-16 LAB
MAYO GENERIC ORDERABLE RESULT: ABNORMAL
OHS QRS DURATION: 80 MS
OHS QTC CALCULATION: 476 MS

## 2024-09-16 NOTE — PLAN OF CARE
[facility] - [facility] Telemetry  Discharge Final Note    Primary Care Provider: Deysi Simeon FNP    Expected Discharge Date: 9/15/2024    Final Discharge Note (most recent)       Final Note - 09/16/24 0907          Final Note    Assessment Type Final Discharge Note     Anticipated Discharge Disposition Home or Self Care        Post-Acute Status    Discharge Delays None known at this time                     Important Message from Medicare             pt dc home

## 2024-09-17 ENCOUNTER — HOSPITAL ENCOUNTER (EMERGENCY)
Facility: HOSPITAL | Age: 44
Discharge: HOME | End: 2024-09-17
Attending: EMERGENCY MEDICINE
Payer: MEDICAID

## 2024-09-17 VITALS
BODY MASS INDEX: 21.68 KG/M2 | SYSTOLIC BLOOD PRESSURE: 134 MMHG | OXYGEN SATURATION: 97 % | DIASTOLIC BLOOD PRESSURE: 88 MMHG | WEIGHT: 127 LBS | TEMPERATURE: 98 F | HEART RATE: 104 BPM | RESPIRATION RATE: 20 BRPM | HEIGHT: 64 IN

## 2024-09-17 DIAGNOSIS — G47.00 INSOMNIA, UNSPECIFIED TYPE: Primary | ICD-10-CM

## 2024-09-17 DIAGNOSIS — R44.3 HALLUCINATIONS: ICD-10-CM

## 2024-09-17 LAB
OHS QRS DURATION: 102 MS
OHS QTC CALCULATION: 479 MS

## 2024-09-17 PROCEDURE — 96372 THER/PROPH/DIAG INJ SC/IM: CPT | Performed by: EMERGENCY MEDICINE

## 2024-09-17 PROCEDURE — 99284 EMERGENCY DEPT VISIT MOD MDM: CPT | Mod: 25

## 2024-09-17 PROCEDURE — 63600175 PHARM REV CODE 636 W HCPCS: Performed by: EMERGENCY MEDICINE

## 2024-09-17 RX ORDER — DIAZEPAM 10 MG/2ML
5 INJECTION INTRAMUSCULAR
Status: COMPLETED | OUTPATIENT
Start: 2024-09-17 | End: 2024-09-17

## 2024-09-17 RX ORDER — HALOPERIDOL 5 MG/ML
5 INJECTION INTRAMUSCULAR
Status: COMPLETED | OUTPATIENT
Start: 2024-09-17 | End: 2024-09-17

## 2024-09-17 RX ADMIN — DIAZEPAM 5 MG: 5 INJECTION INTRAMUSCULAR; INTRAVENOUS at 06:09

## 2024-09-17 RX ADMIN — HALOPERIDOL LACTATE 5 MG: 5 INJECTION, SOLUTION INTRAMUSCULAR at 06:09

## 2024-09-17 NOTE — DISCHARGE INSTRUCTIONS
Go home and get some sleep.  Follow up in clinic with psychiatrist as soon as possible.  Return to emergency department for any worsening or further problems.
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
## 2024-11-14 ENCOUNTER — HOSPITAL ENCOUNTER (OUTPATIENT)
Dept: RADIOLOGY | Facility: HOSPITAL | Age: 44
Discharge: HOME | End: 2024-11-14
Attending: SPECIALIST
Payer: MEDICAID

## 2024-11-14 VITALS — HEIGHT: 64 IN | BODY MASS INDEX: 21.68 KG/M2 | WEIGHT: 127 LBS

## 2024-11-14 DIAGNOSIS — Z12.31 OTHER SCREENING MAMMOGRAM: ICD-10-CM

## 2024-11-14 PROCEDURE — 77067 SCR MAMMO BI INCL CAD: CPT | Mod: 26,,, | Performed by: RADIOLOGY

## 2024-11-14 PROCEDURE — 77063 BREAST TOMOSYNTHESIS BI: CPT | Mod: 26,,, | Performed by: RADIOLOGY

## 2024-11-14 PROCEDURE — 77063 BREAST TOMOSYNTHESIS BI: CPT | Mod: TC

## 2025-05-15 ENCOUNTER — OFFICE VISIT (OUTPATIENT)
Dept: FAMILY MEDICINE | Facility: CLINIC | Age: 45
End: 2025-05-15
Payer: MEDICAID

## 2025-05-15 VITALS
OXYGEN SATURATION: 98 % | HEART RATE: 82 BPM | SYSTOLIC BLOOD PRESSURE: 104 MMHG | HEIGHT: 64 IN | TEMPERATURE: 98 F | BODY MASS INDEX: 21.8 KG/M2 | RESPIRATION RATE: 18 BRPM | DIASTOLIC BLOOD PRESSURE: 62 MMHG

## 2025-05-15 DIAGNOSIS — J01.90 ACUTE NON-RECURRENT SINUSITIS, UNSPECIFIED LOCATION: Primary | ICD-10-CM

## 2025-05-15 DIAGNOSIS — R07.0 PAIN IN THROAT: ICD-10-CM

## 2025-05-15 LAB
CTP QC/QA: YES
MOLECULAR STREP A: NEGATIVE

## 2025-05-15 RX ORDER — CETIRIZINE HYDROCHLORIDE 10 MG/1
10 TABLET ORAL DAILY
Qty: 30 TABLET | Refills: 2 | Status: SHIPPED | OUTPATIENT
Start: 2025-05-15

## 2025-05-15 RX ORDER — DEXAMETHASONE SODIUM PHOSPHATE 10 MG/ML
8 INJECTION INTRAMUSCULAR; INTRAVENOUS
Status: COMPLETED | OUTPATIENT
Start: 2025-05-15 | End: 2025-05-15

## 2025-05-15 RX ORDER — AMOXICILLIN 500 MG/1
500 CAPSULE ORAL EVERY 12 HOURS
Qty: 20 CAPSULE | Refills: 0 | Status: SHIPPED | OUTPATIENT
Start: 2025-05-15 | End: 2025-05-25

## 2025-05-15 RX ORDER — FLUTICASONE PROPIONATE 50 MCG
2 SPRAY, SUSPENSION (ML) NASAL DAILY
Qty: 11.1 ML | Refills: 2 | Status: SHIPPED | OUTPATIENT
Start: 2025-05-15

## 2025-05-15 RX ADMIN — DEXAMETHASONE SODIUM PHOSPHATE 8 MG: 10 INJECTION INTRAMUSCULAR; INTRAVENOUS at 12:05

## 2025-05-15 NOTE — PROGRESS NOTES
Subjective     Patient ID: Ericka Vo is a 45 y.o. female.    Chief Complaint: Nasal Congestion (45 y.o. female c/o chest cough. States it is breaking up in her throat and ears. States its prickly and itch. )    ADRIANNE is a 45 year old female that presents today for complaints of cough, chest congestion, and sinus pressure that began several days ago. She denies fever. She also has complaints of a scratchy throat an would like to be tested for strep.    Review of Systems   Constitutional:  Negative for chills, fatigue and fever.   HENT:  Positive for sinus pressure/congestion. Negative for nasal congestion, ear pain, postnasal drip, rhinorrhea, sneezing and sore throat.    Respiratory:  Positive for cough. Negative for shortness of breath.    Cardiovascular:  Negative for chest pain and palpitations.   Integumentary:  Negative for color change and pallor.          Objective     Physical Exam  Vitals and nursing note reviewed.   Constitutional:       Appearance: Normal appearance. She is normal weight.   HENT:      Head: Normocephalic and atraumatic.      Right Ear: Tympanic membrane normal.      Left Ear: Tympanic membrane normal.      Nose: Congestion present.      Mouth/Throat:      Mouth: Mucous membranes are moist.      Pharynx: Oropharynx is clear. Posterior oropharyngeal erythema present.   Eyes:      Extraocular Movements: Extraocular movements intact.      Conjunctiva/sclera: Conjunctivae normal.      Pupils: Pupils are equal, round, and reactive to light.   Cardiovascular:      Rate and Rhythm: Normal rate and regular rhythm.      Pulses: Normal pulses.      Heart sounds: Normal heart sounds.   Pulmonary:      Effort: Pulmonary effort is normal.      Breath sounds: Normal breath sounds.   Musculoskeletal:         General: Normal range of motion.      Cervical back: Normal range of motion and neck supple.   Skin:     General: Skin is warm and dry.   Neurological:      General: No focal deficit present.       Mental Status: She is alert and oriented to person, place, and time.          Assessment and Plan     1. Acute non-recurrent sinusitis, unspecified location  -     dexAMETHasone injection 8 mg  -     amoxicillin (AMOXIL) 500 MG capsule; Take 1 capsule (500 mg total) by mouth every 12 (twelve) hours. for 10 days  Dispense: 20 capsule; Refill: 0  -     fluticasone propionate (FLONASE) 50 mcg/actuation nasal spray; 2 sprays (100 mcg total) by Each Nostril route once daily.  Dispense: 11.1 mL; Refill: 2  -     cetirizine (ZYRTEC) 10 MG tablet; Take 1 tablet (10 mg total) by mouth once daily.  Dispense: 30 tablet; Refill: 2    2. Pain in throat  -     POCT Strep A, Molecular        Take medications as prescribed  Daily antihistamine may be beneficial  Nasal steroid  Increase PO fluid intake         Follow up if symptoms worsen or fail to improve.